# Patient Record
Sex: FEMALE | Race: WHITE | NOT HISPANIC OR LATINO | URBAN - METROPOLITAN AREA
[De-identification: names, ages, dates, MRNs, and addresses within clinical notes are randomized per-mention and may not be internally consistent; named-entity substitution may affect disease eponyms.]

---

## 2021-08-24 ENCOUNTER — OFFICE VISIT (OUTPATIENT)
Dept: FAMILY MEDICINE CLINIC | Facility: CLINIC | Age: 51
End: 2021-08-24

## 2021-08-24 VITALS
HEART RATE: 66 BPM | DIASTOLIC BLOOD PRESSURE: 86 MMHG | SYSTOLIC BLOOD PRESSURE: 150 MMHG | WEIGHT: 149.6 LBS | TEMPERATURE: 97 F | OXYGEN SATURATION: 98 % | HEIGHT: 63 IN | BODY MASS INDEX: 26.51 KG/M2

## 2021-08-24 DIAGNOSIS — G44.89 OTHER HEADACHE SYNDROME: ICD-10-CM

## 2021-08-24 DIAGNOSIS — Z78.0 MENOPAUSE: ICD-10-CM

## 2021-08-24 DIAGNOSIS — R60.9 EDEMA, UNSPECIFIED TYPE: ICD-10-CM

## 2021-08-24 DIAGNOSIS — N30.00 ACUTE CYSTITIS WITHOUT HEMATURIA: ICD-10-CM

## 2021-08-24 DIAGNOSIS — I10 ESSENTIAL HYPERTENSION: Primary | ICD-10-CM

## 2021-08-24 DIAGNOSIS — M79.2 NEURITIS: ICD-10-CM

## 2021-08-24 LAB
BILIRUB BLD-MCNC: NEGATIVE MG/DL
GLUCOSE UR STRIP-MCNC: NEGATIVE MG/DL
KETONES UR QL: NEGATIVE
LEUKOCYTE EST, POC: NEGATIVE
NITRITE UR-MCNC: NEGATIVE MG/ML
PH UR: 6 [PH] (ref 5–8)
PROT UR STRIP-MCNC: NEGATIVE MG/DL
RBC # UR STRIP: NEGATIVE /UL
SP GR UR: 1.01 (ref 1–1.03)
UROBILINOGEN UR QL: NORMAL

## 2021-08-24 PROCEDURE — 81003 URINALYSIS AUTO W/O SCOPE: CPT | Performed by: FAMILY MEDICINE

## 2021-08-24 PROCEDURE — 99204 OFFICE O/P NEW MOD 45 MIN: CPT | Performed by: FAMILY MEDICINE

## 2021-08-24 RX ORDER — ATENOLOL 25 MG/1
25 TABLET ORAL DAILY
Qty: 90 TABLET | Refills: 0 | Status: SHIPPED | OUTPATIENT
Start: 2021-08-24 | End: 2021-09-23

## 2021-08-24 RX ORDER — CALCIUM CARBONATE 260MG(650)
TABLET,CHEWABLE ORAL
COMMUNITY
End: 2022-07-15

## 2021-08-24 RX ORDER — ASCORBIC ACID 100 MG
TABLET,CHEWABLE ORAL
COMMUNITY
End: 2023-01-06

## 2021-08-24 RX ORDER — CETIRIZINE HYDROCHLORIDE 10 MG/1
10 TABLET ORAL DAILY
COMMUNITY
End: 2023-01-06

## 2021-08-24 RX ORDER — FLUOXETINE HYDROCHLORIDE 20 MG/1
20 CAPSULE ORAL DAILY
Qty: 90 CAPSULE | Refills: 0 | Status: SHIPPED | OUTPATIENT
Start: 2021-08-24 | End: 2021-09-30 | Stop reason: DRUGHIGH

## 2021-08-24 RX ORDER — HYDROCHLOROTHIAZIDE 12.5 MG/1
12.5 TABLET ORAL DAILY
Qty: 90 TABLET | Refills: 0 | Status: SHIPPED | OUTPATIENT
Start: 2021-08-24 | End: 2021-09-30

## 2021-08-24 NOTE — PROGRESS NOTES
"Chief Complaint  Establish Care, Hypertension, Menopause (Every month breast tenderness, hot flashes, pain in ovaries ), and Edema (Right Side of Foot, Legs)    Subjective          Tanya Brunner presents to [facility]  Here to establish.  Went to urgent with HA.  Blood pressure was very high.  Has had some swelling in her feet for the past month.  Tries to drink water.  Went to urgent care.  They put her back on her atenolol.  Has had HTN since her 20s.  Her previous MD had retired.  She has been having some increased headaches lately as well.  She denies any chest pain or shortness of breath.  She has been concerned about some hot flashes and other menopausal symptoms.  She had a hysterectomy subtotal several years ago.  When she was at the urgent care the other day she did have a UTI as well.      Objective   Vital Signs:   /86   Pulse 66   Temp 97 °F (36.1 °C)   Ht 160 cm (63\")   Wt 67.9 kg (149 lb 9.6 oz)   SpO2 98%   BMI 26.50 kg/m²     Physical Exam  Vitals and nursing note reviewed.   Constitutional:       General: She is not in acute distress.     Appearance: Normal appearance. She is well-developed.   HENT:      Head: Normocephalic and atraumatic.      Right Ear: Tympanic membrane, ear canal and external ear normal.      Left Ear: Tympanic membrane, ear canal and external ear normal.      Nose: Nose normal.      Mouth/Throat:      Mouth: Mucous membranes are moist.      Pharynx: Oropharynx is clear. No oropharyngeal exudate or posterior oropharyngeal erythema.   Eyes:      Conjunctiva/sclera: Conjunctivae normal.      Pupils: Pupils are equal, round, and reactive to light.   Neck:      Thyroid: No thyromegaly.   Cardiovascular:      Rate and Rhythm: Normal rate and regular rhythm.      Heart sounds: No murmur heard.     Pulmonary:      Effort: Pulmonary effort is normal.      Breath sounds: Normal breath sounds. No wheezing.   Abdominal:      General: Abdomen is " flat. Bowel sounds are normal. There is no distension.      Palpations: Abdomen is soft. There is no mass.      Tenderness: There is no abdominal tenderness.      Hernia: No hernia is present.   Musculoskeletal:         General: No swelling. Normal range of motion.      Cervical back: Normal range of motion and neck supple.      Right lower leg: Edema present.      Left lower leg: Edema present.      Comments: Edema bilateral lower extremities into the feet   Lymphadenopathy:      Cervical: No cervical adenopathy.   Skin:     General: Skin is warm and dry.      Capillary Refill: Capillary refill takes less than 2 seconds.      Findings: No rash.   Neurological:      General: No focal deficit present.      Mental Status: She is alert and oriented to person, place, and time.      Cranial Nerves: No cranial nerve deficit.   Psychiatric:         Mood and Affect: Mood normal.         Behavior: Behavior normal.        Result Review :                 Assessment and Plan    Diagnoses and all orders for this visit:    1. Essential hypertension (Primary)  -     CBC & Differential  -     Comprehensive Metabolic Panel  -     TSH  -     hydroCHLOROthiazide (HYDRODIURIL) 12.5 MG tablet; Take 1 tablet by mouth Daily.  Dispense: 90 tablet; Refill: 0  -     atenolol (TENORMIN) 25 MG tablet; Take 1 tablet by mouth Daily for 30 days.  Dispense: 90 tablet; Refill: 0    2. Edema, unspecified type  -     CBC & Differential  -     Comprehensive Metabolic Panel  -     TSH    3. Neuritis  -     CBC & Differential  -     Comprehensive Metabolic Panel  -     TSH  -     Vitamin B12  -     Vitamin B6    4. Menopause  -     Follicle Stimulating Hormone  -     FLUoxetine (PROzac) 20 MG capsule; Take 1 capsule by mouth Daily.  Dispense: 90 capsule; Refill: 0    5. Other headache syndrome  -     atenolol (TENORMIN) 25 MG tablet; Take 1 tablet by mouth Daily for 30 days.  Dispense: 90 tablet; Refill: 0    6. Acute cystitis without hematuria  -      Urine Culture - Urine, Urine, Clean Catch  -     POC Urinalysis Dipstick, Automated    Hypertension-not to goal, will add in hydrochlorothiazide and continue Tenormin  Edema-we will check labs to look for other etiology  Neuritis-we will check labs  Menopause-we will check an FSH and try fluoxetine  Follow-up acute cystitis-recheck urine is normal today    Follow Up   Return in about 6 weeks (around 10/5/2021).  Patient was given instructions and counseling regarding her condition or for health maintenance advice. Please see specific information pulled into the AVS if appropriate.

## 2021-08-26 LAB
BACTERIA UR CULT: NORMAL
BACTERIA UR CULT: NORMAL

## 2021-08-27 LAB
ALBUMIN SERPL-MCNC: 4.3 G/DL (ref 3.8–4.9)
ALBUMIN/GLOB SERPL: 1.5 {RATIO} (ref 1.2–2.2)
ALP SERPL-CCNC: 51 IU/L (ref 48–121)
ALT SERPL-CCNC: 26 IU/L (ref 0–32)
AST SERPL-CCNC: 31 IU/L (ref 0–40)
BASOPHILS # BLD AUTO: 0.1 X10E3/UL (ref 0–0.2)
BASOPHILS NFR BLD AUTO: 1 %
BILIRUB SERPL-MCNC: <0.2 MG/DL (ref 0–1.2)
BUN SERPL-MCNC: 6 MG/DL (ref 6–24)
BUN/CREAT SERPL: 9 (ref 9–23)
CALCIUM SERPL-MCNC: 9.1 MG/DL (ref 8.7–10.2)
CHLORIDE SERPL-SCNC: 102 MMOL/L (ref 96–106)
CO2 SERPL-SCNC: 21 MMOL/L (ref 20–29)
CREAT SERPL-MCNC: 0.64 MG/DL (ref 0.57–1)
EOSINOPHIL # BLD AUTO: 0.1 X10E3/UL (ref 0–0.4)
EOSINOPHIL NFR BLD AUTO: 1 %
ERYTHROCYTE [DISTWIDTH] IN BLOOD BY AUTOMATED COUNT: 12.2 % (ref 11.7–15.4)
FSH SERPL-ACNC: 7 MIU/ML
GLOBULIN SER CALC-MCNC: 2.8 G/DL (ref 1.5–4.5)
GLUCOSE SERPL-MCNC: 91 MG/DL (ref 65–99)
HCT VFR BLD AUTO: 40.3 % (ref 34–46.6)
HGB BLD-MCNC: 14.1 G/DL (ref 11.1–15.9)
IMM GRANULOCYTES # BLD AUTO: 0 X10E3/UL (ref 0–0.1)
IMM GRANULOCYTES NFR BLD AUTO: 0 %
LYMPHOCYTES # BLD AUTO: 3.2 X10E3/UL (ref 0.7–3.1)
LYMPHOCYTES NFR BLD AUTO: 40 %
MCH RBC QN AUTO: 36.1 PG (ref 26.6–33)
MCHC RBC AUTO-ENTMCNC: 35 G/DL (ref 31.5–35.7)
MCV RBC AUTO: 103 FL (ref 79–97)
MONOCYTES # BLD AUTO: 0.6 X10E3/UL (ref 0.1–0.9)
MONOCYTES NFR BLD AUTO: 8 %
NEUTROPHILS # BLD AUTO: 4 X10E3/UL (ref 1.4–7)
NEUTROPHILS NFR BLD AUTO: 50 %
PLATELET # BLD AUTO: 239 X10E3/UL (ref 150–450)
POTASSIUM SERPL-SCNC: 4.2 MMOL/L (ref 3.5–5.2)
PROT SERPL-MCNC: 7.1 G/DL (ref 6–8.5)
RBC # BLD AUTO: 3.91 X10E6/UL (ref 3.77–5.28)
SODIUM SERPL-SCNC: 137 MMOL/L (ref 134–144)
TSH SERPL DL<=0.005 MIU/L-ACNC: 2.64 UIU/ML (ref 0.45–4.5)
VIT B12 SERPL-MCNC: 1059 PG/ML (ref 232–1245)
VIT B6 SERPL-MCNC: 113.2 UG/L (ref 2–32.8)
WBC # BLD AUTO: 8 X10E3/UL (ref 3.4–10.8)

## 2021-08-31 LAB
FOLATE SERPL-MCNC: 14.3 NG/ML
WRITTEN AUTHORIZATION: NORMAL

## 2021-09-30 ENCOUNTER — OFFICE VISIT (OUTPATIENT)
Dept: FAMILY MEDICINE CLINIC | Facility: CLINIC | Age: 51
End: 2021-09-30

## 2021-09-30 ENCOUNTER — TELEPHONE (OUTPATIENT)
Dept: FAMILY MEDICINE CLINIC | Facility: CLINIC | Age: 51
End: 2021-09-30

## 2021-09-30 VITALS
OXYGEN SATURATION: 98 % | TEMPERATURE: 96 F | HEART RATE: 70 BPM | DIASTOLIC BLOOD PRESSURE: 80 MMHG | BODY MASS INDEX: 25.87 KG/M2 | SYSTOLIC BLOOD PRESSURE: 138 MMHG | HEIGHT: 63 IN | WEIGHT: 146 LBS

## 2021-09-30 DIAGNOSIS — D75.89 MACROCYTOSIS WITHOUT ANEMIA: ICD-10-CM

## 2021-09-30 DIAGNOSIS — I10 ESSENTIAL HYPERTENSION: Primary | ICD-10-CM

## 2021-09-30 DIAGNOSIS — Z20.822 CLOSE EXPOSURE TO COVID-19 VIRUS: ICD-10-CM

## 2021-09-30 DIAGNOSIS — N95.1 PERIMENOPAUSAL SYMPTOMS: ICD-10-CM

## 2021-09-30 PROCEDURE — 99214 OFFICE O/P EST MOD 30 MIN: CPT | Performed by: FAMILY MEDICINE

## 2021-09-30 RX ORDER — FLUOXETINE HYDROCHLORIDE 40 MG/1
40 CAPSULE ORAL DAILY
Qty: 90 CAPSULE | Refills: 1 | Status: SHIPPED | OUTPATIENT
Start: 2021-09-30 | End: 2022-01-04 | Stop reason: SINTOL

## 2021-09-30 NOTE — PROGRESS NOTES
"Chief Complaint  Hypertension    Subjective          Tanya Brunner presents to [facility]  Here for follow up blood pressure.   Has not been checking it at home.  Has not had chest pain or SOA.  Thinks she is having panic attacks at night.  Wakes up with heart pounding 3 times now.  Is under a lot of stress.  Fluoxetine has been helping some.  Denies any SI or HI.      Objective   Vital Signs:   /80   Pulse 70   Temp 96 °F (35.6 °C)   Ht 160 cm (63\")   Wt 66.2 kg (146 lb)   SpO2 98%   BMI 25.86 kg/m²     Physical Exam  Constitutional:       General: She is not in acute distress.     Appearance: Normal appearance. She is well-developed.   HENT:      Head: Normocephalic and atraumatic.      Right Ear: Tympanic membrane, ear canal and external ear normal.      Left Ear: Tympanic membrane, ear canal and external ear normal.      Mouth/Throat:      Mouth: Mucous membranes are moist.      Pharynx: Oropharynx is clear.   Eyes:      Conjunctiva/sclera: Conjunctivae normal.      Pupils: Pupils are equal, round, and reactive to light.   Neck:      Thyroid: No thyromegaly.   Cardiovascular:      Rate and Rhythm: Normal rate and regular rhythm.      Heart sounds: No murmur heard.     Pulmonary:      Effort: Pulmonary effort is normal.      Breath sounds: Normal breath sounds. No wheezing.   Musculoskeletal:         General: Normal range of motion.      Cervical back: Neck supple.   Lymphadenopathy:      Cervical: No cervical adenopathy.   Skin:     General: Skin is warm and dry.   Neurological:      Mental Status: She is alert and oriented to person, place, and time.   Psychiatric:         Mood and Affect: Mood normal.         Behavior: Behavior normal.        Result Review :            CBC & Differential (08/24/2021 14:53)  Comprehensive Metabolic Panel (08/24/2021 14:53)  TSH (08/24/2021 14:53)  Vitamin B12 (08/24/2021 14:53)  Vitamin B6 (08/24/2021 14:53)  Follicle Stimulating " Hormone (08/24/2021 14:53)  Folate (08/24/2021 14:53)       Assessment and Plan    Diagnoses and all orders for this visit:    1. Essential hypertension (Primary)  -     Basic Metabolic Panel  -     atenolol 25 MG tablet 25 mg, chlorthalidone 25 MG tablet 12.5 mg per tablet; Take 0.5 tablets by mouth Daily.  Dispense: 45 tablet; Refill: 1    2. Macrocytosis without anemia  -     CBC & Differential    3. Close exposure to COVID-19 virus  -     SARS-CoV-2 Antibodies (Roche)    4. Perimenopausal symptoms  -     FLUoxetine (PROzac) 40 MG capsule; Take 1 capsule by mouth Daily.  Dispense: 90 capsule; Refill: 1        Follow Up   Return in about 3 months (around 12/30/2021) for Recheck HTN.  Patient was given instructions and counseling regarding her condition or for health maintenance advice. Please see specific information pulled into the AVS if appropriate.

## 2021-10-01 LAB
BASOPHILS # BLD AUTO: 0.03 10*3/MM3 (ref 0–0.2)
BASOPHILS NFR BLD AUTO: 0.4 % (ref 0–1.5)
BUN SERPL-MCNC: 9 MG/DL (ref 6–20)
BUN/CREAT SERPL: 13.8 (ref 7–25)
CALCIUM SERPL-MCNC: 9.2 MG/DL (ref 8.6–10.5)
CHLORIDE SERPL-SCNC: 101 MMOL/L (ref 98–107)
CO2 SERPL-SCNC: 25.6 MMOL/L (ref 22–29)
CREAT SERPL-MCNC: 0.65 MG/DL (ref 0.57–1)
EOSINOPHIL # BLD AUTO: 0.04 10*3/MM3 (ref 0–0.4)
EOSINOPHIL NFR BLD AUTO: 0.5 % (ref 0.3–6.2)
ERYTHROCYTE [DISTWIDTH] IN BLOOD BY AUTOMATED COUNT: 12.3 % (ref 12.3–15.4)
GLUCOSE SERPL-MCNC: 76 MG/DL (ref 65–99)
HCT VFR BLD AUTO: 36.8 % (ref 34–46.6)
HGB BLD-MCNC: 13.1 G/DL (ref 12–15.9)
IMM GRANULOCYTES # BLD AUTO: 0.02 10*3/MM3 (ref 0–0.05)
IMM GRANULOCYTES NFR BLD AUTO: 0.3 % (ref 0–0.5)
LYMPHOCYTES # BLD AUTO: 2.56 10*3/MM3 (ref 0.7–3.1)
LYMPHOCYTES NFR BLD AUTO: 32.4 % (ref 19.6–45.3)
MCH RBC QN AUTO: 36.2 PG (ref 26.6–33)
MCHC RBC AUTO-ENTMCNC: 35.6 G/DL (ref 31.5–35.7)
MCV RBC AUTO: 101.7 FL (ref 79–97)
MONOCYTES # BLD AUTO: 0.65 10*3/MM3 (ref 0.1–0.9)
MONOCYTES NFR BLD AUTO: 8.2 % (ref 5–12)
NEUTROPHILS # BLD AUTO: 4.6 10*3/MM3 (ref 1.7–7)
NEUTROPHILS NFR BLD AUTO: 58.2 % (ref 42.7–76)
NRBC BLD AUTO-RTO: 0 /100 WBC (ref 0–0.2)
PLATELET # BLD AUTO: 244 10*3/MM3 (ref 140–450)
POTASSIUM SERPL-SCNC: 4 MMOL/L (ref 3.5–5.2)
RBC # BLD AUTO: 3.62 10*6/MM3 (ref 3.77–5.28)
SARS-COV-2 AB SERPL QL IA: NEGATIVE
SODIUM SERPL-SCNC: 140 MMOL/L (ref 136–145)
WBC # BLD AUTO: 7.9 10*3/MM3 (ref 3.4–10.8)

## 2022-01-03 RX ORDER — ATENOLOL AND CHLORTHALIDONE TABLET 50; 25 MG/1; MG/1
TABLET ORAL
Qty: 45 TABLET | Refills: 0 | Status: SHIPPED | OUTPATIENT
Start: 2022-01-03 | End: 2022-01-04 | Stop reason: SDUPTHER

## 2022-01-04 ENCOUNTER — OFFICE VISIT (OUTPATIENT)
Dept: FAMILY MEDICINE CLINIC | Facility: CLINIC | Age: 52
End: 2022-01-04

## 2022-01-04 VITALS
OXYGEN SATURATION: 100 % | BODY MASS INDEX: 26.75 KG/M2 | DIASTOLIC BLOOD PRESSURE: 64 MMHG | TEMPERATURE: 97.7 F | WEIGHT: 151 LBS | SYSTOLIC BLOOD PRESSURE: 128 MMHG | HEART RATE: 68 BPM | HEIGHT: 63 IN

## 2022-01-04 DIAGNOSIS — Z12.31 ENCOUNTER FOR SCREENING MAMMOGRAM FOR MALIGNANT NEOPLASM OF BREAST: ICD-10-CM

## 2022-01-04 DIAGNOSIS — I10 ESSENTIAL HYPERTENSION: Primary | ICD-10-CM

## 2022-01-04 PROCEDURE — 99213 OFFICE O/P EST LOW 20 MIN: CPT | Performed by: FAMILY MEDICINE

## 2022-01-04 RX ORDER — ATENOLOL AND CHLORTHALIDONE TABLET 50; 25 MG/1; MG/1
0.5 TABLET ORAL DAILY
Qty: 45 TABLET | Refills: 3 | Status: SHIPPED | OUTPATIENT
Start: 2022-01-04 | End: 2023-01-06 | Stop reason: SDUPTHER

## 2022-01-04 NOTE — PROGRESS NOTES
"Chief Complaint  Med Refill and Hypertension    Subjective          Tanya Brunner presents to [facility]  Patient presents for blood pressure follow-up.  She is doing very well with her current medication.  She could not take the fluoxetine for her hot flashes because it made her feel off.  She is not having any headaches anymore.  She denies any chest pain or shortness of breath.      Objective   Vital Signs:   /64   Pulse 68   Temp 97.7 °F (36.5 °C)   Ht 160 cm (63\")   Wt 68.5 kg (151 lb)   SpO2 100%   BMI 26.75 kg/m²     Physical Exam  Constitutional:       General: She is not in acute distress.     Appearance: Normal appearance. She is well-developed.   HENT:      Head: Normocephalic and atraumatic.      Right Ear: External ear normal.      Left Ear: External ear normal.   Eyes:      Conjunctiva/sclera: Conjunctivae normal.      Pupils: Pupils are equal, round, and reactive to light.   Neck:      Thyroid: No thyromegaly.   Cardiovascular:      Rate and Rhythm: Normal rate and regular rhythm.      Heart sounds: No murmur heard.      Pulmonary:      Effort: Pulmonary effort is normal.      Breath sounds: Normal breath sounds. No wheezing.   Musculoskeletal:         General: Normal range of motion.      Cervical back: Neck supple.   Lymphadenopathy:      Cervical: No cervical adenopathy.   Skin:     General: Skin is warm and dry.   Neurological:      Mental Status: She is alert and oriented to person, place, and time.   Psychiatric:         Mood and Affect: Mood normal.         Behavior: Behavior normal.        Result Review :                 Assessment and Plan    Diagnoses and all orders for this visit:    1. Essential hypertension (Primary)  -     atenolol-chlorthalidone (TENORETIC) 50-25 MG per tablet; Take 0.5 tablets by mouth Daily.  Dispense: 45 tablet; Refill: 3  -     Basic Metabolic Panel    2. Encounter for screening mammogram for malignant neoplasm of breast  - "     Mammo Screening Digital Tomosynthesis Bilateral With CAD; Future    Hypertension-controlled, continue current medication and check a BMP today  Follow-up for physical    Follow Up   Return in about 6 months (around 7/4/2022) for Annual physical.  Patient was given instructions and counseling regarding her condition or for health maintenance advice. Please see specific information pulled into the AVS if appropriate.

## 2022-01-05 LAB
BUN SERPL-MCNC: 9 MG/DL (ref 6–24)
BUN/CREAT SERPL: 13 (ref 9–23)
CALCIUM SERPL-MCNC: 9.4 MG/DL (ref 8.7–10.2)
CHLORIDE SERPL-SCNC: 100 MMOL/L (ref 96–106)
CO2 SERPL-SCNC: 25 MMOL/L (ref 20–29)
CREAT SERPL-MCNC: 0.67 MG/DL (ref 0.57–1)
GLUCOSE SERPL-MCNC: 99 MG/DL (ref 65–99)
POTASSIUM SERPL-SCNC: 3.6 MMOL/L (ref 3.5–5.2)
SODIUM SERPL-SCNC: 142 MMOL/L (ref 134–144)

## 2022-02-04 DIAGNOSIS — Z12.31 ENCOUNTER FOR SCREENING MAMMOGRAM FOR MALIGNANT NEOPLASM OF BREAST: ICD-10-CM

## 2022-07-05 ENCOUNTER — TELEPHONE (OUTPATIENT)
Dept: FAMILY MEDICINE CLINIC | Facility: CLINIC | Age: 52
End: 2022-07-05

## 2022-07-05 NOTE — TELEPHONE ENCOUNTER
Caller: Brunner, Tanya    Relationship: Self    Best call back number:[phone]    What is the best time to reach you: THIS MORNING- SHOULD SHE KEEP HER APPOINTMENT WITH SYMPTOMS/   7/6/22?    Who are you requesting to speak with (clinical staff, provider,  specific staff member): CLINICAL      What was the call regarding: PATIENT HAS AN APPOINTMENT TOMORROW.  SHE IS EXPERIENCING SINUS AND ALLERGY ISSUES, COUGHING, CONGESTION AND HAS TESTED ON 7/3/22 FOR COVID WHICH WAS NEGATIVE.  PATIENT HAS SWOLLEN GLANDS AND CONTINUED COUGHING NOW FOR 3 WEEKS.    Do you require a callback: YES

## 2022-07-06 ENCOUNTER — OFFICE VISIT (OUTPATIENT)
Dept: FAMILY MEDICINE CLINIC | Facility: CLINIC | Age: 52
End: 2022-07-06

## 2022-07-06 VITALS
HEART RATE: 58 BPM | HEIGHT: 63 IN | TEMPERATURE: 97.3 F | WEIGHT: 151.2 LBS | DIASTOLIC BLOOD PRESSURE: 66 MMHG | BODY MASS INDEX: 26.79 KG/M2 | SYSTOLIC BLOOD PRESSURE: 108 MMHG | OXYGEN SATURATION: 97 %

## 2022-07-06 DIAGNOSIS — Z11.59 NEED FOR HEPATITIS C SCREENING TEST: ICD-10-CM

## 2022-07-06 DIAGNOSIS — N95.1 PERIMENOPAUSAL SYMPTOMS: ICD-10-CM

## 2022-07-06 DIAGNOSIS — J30.1 SEASONAL ALLERGIC RHINITIS DUE TO POLLEN: ICD-10-CM

## 2022-07-06 DIAGNOSIS — Z00.00 ROUTINE HEALTH MAINTENANCE: Primary | ICD-10-CM

## 2022-07-06 DIAGNOSIS — R05.9 COUGH: ICD-10-CM

## 2022-07-06 DIAGNOSIS — M79.671 PAIN OF RIGHT HEEL: ICD-10-CM

## 2022-07-06 DIAGNOSIS — I10 ESSENTIAL HYPERTENSION: ICD-10-CM

## 2022-07-06 LAB
EXPIRATION DATE: NORMAL
INTERNAL CONTROL: NORMAL
Lab: NORMAL
SARS-COV-2 AG UPPER RESP QL IA.RAPID: NOT DETECTED

## 2022-07-06 PROCEDURE — 99213 OFFICE O/P EST LOW 20 MIN: CPT | Performed by: FAMILY MEDICINE

## 2022-07-06 PROCEDURE — 87426 SARSCOV CORONAVIRUS AG IA: CPT | Performed by: FAMILY MEDICINE

## 2022-07-06 PROCEDURE — 99396 PREV VISIT EST AGE 40-64: CPT | Performed by: FAMILY MEDICINE

## 2022-07-06 RX ORDER — ALBUTEROL SULFATE 90 UG/1
2 AEROSOL, METERED RESPIRATORY (INHALATION) EVERY 4 HOURS PRN
Qty: 18 G | Refills: 0 | Status: SHIPPED | OUTPATIENT
Start: 2022-07-06 | End: 2022-07-15 | Stop reason: SDUPTHER

## 2022-07-06 RX ORDER — OMEGA-3S/DHA/EPA/FISH OIL/D3 300MG-1000
400 CAPSULE ORAL DAILY
COMMUNITY

## 2022-07-06 NOTE — PROGRESS NOTES
Subjective   Tanya Brunner is a 51 y.o. female who presents for annual female wellness exam.  Chief Complaint   Patient presents with   • Annual Exam   • Hypertension   • Cough     For 3 weeks sinuses      Has been coughing for 3 weeks.  Does have grass allergies.  Worse phelgm over the past 3-4 days with post nasal drainage.  Bad at night.  Ear and head congestion.  No ST.  No fever.  Has not been taking her Flonase.  Has not been taking any antihistamine.  Has been having pain in her right heel for few weeks.  No injuries.  No swelling.    Menstrual History: s/p hyst, still has ovaries  Pregnancy History: , s/p   Sexual History: some, with spouse   Contraception: na  Hormone Replacement Therapy: na  Diet: standard  Exercise: yes, pool, yardwork  Up to date with eye and dental.    Mammogram: 2022  Pap Smear: na  Bone Density: na  Colon Cancer Screenin/3/2018 diverticula    Immunization History   Administered Date(s) Administered   • Fluzone Split Quad (Multi-dose) 2018       The following portions of the patient's history were reviewed and updated as appropriate: allergies, current medications, past family history, past medical history, past social history, past surgical history and problem list.    Past Medical History:   Diagnosis Date   • Hypertension        Past Surgical History:   Procedure Laterality Date   • INGUINAL HERNIA REPAIR Left    • LAPAROSCOPIC VAGINAL HYSTERECTOMY     • OVARIAN CYST REMOVAL Left        History reviewed. No pertinent family history.    Social History     Socioeconomic History   • Marital status:    Tobacco Use   • Smoking status: Never Smoker   • Smokeless tobacco: Never Used   Vaping Use   • Vaping Use: Never used   Substance and Sexual Activity   • Alcohol use: Never   • Drug use: Never   • Sexual activity: Defer         Current Outpatient Medications:   •  atenolol-chlorthalidone (TENORETIC) 50-25 MG per tablet, Take 0.5 tablets by mouth Daily.,  "Disp: 45 tablet, Rfl: 3  •  cholecalciferol (VITAMIN D3) 10 MCG (400 UNIT) tablet, Take 400 Units by mouth Daily., Disp: , Rfl:   •  Ginger, Zingiber officinalis, (GINGER ROOT PO), Take  by mouth., Disp: , Rfl:   •  albuterol sulfate  (90 Base) MCG/ACT inhaler, Inhale 2 puffs Every 4 (Four) Hours As Needed for Wheezing or Shortness of Air (cough)., Disp: 18 g, Rfl: 0  •  Ascorbic Acid (Vitamin C) 100 MG chewable tablet, Chew., Disp: , Rfl:   •  cetirizine (zyrTEC) 10 MG tablet, Take 10 mg by mouth Daily., Disp: , Rfl:   •  fluticasone (FLONASE) 50 MCG/ACT nasal spray, 2 sprays into the nostril(s) as directed by provider Daily for 7 days., Disp: 11.1 mL, Rfl: 0  •  Niacin (VITAMIN B-3 PO), Take  by mouth., Disp: , Rfl:   •  Pyridoxine HCl (B-6 PO), Take  by mouth., Disp: , Rfl:   •  Zinc 10 MG lozenge, Dissolve  in the mouth., Disp: , Rfl:     Review of Systems    Objective   Vitals:    07/06/22 0917   BP: 108/66   Pulse: 58   Temp: 97.3 °F (36.3 °C)   SpO2: 97%   Weight: 68.6 kg (151 lb 3.2 oz)   Height: 160 cm (63\")     Body mass index is 26.78 kg/m².  Physical Exam  Vitals and nursing note reviewed.   Constitutional:       General: She is not in acute distress.     Appearance: Normal appearance. She is well-developed.   HENT:      Head: Normocephalic and atraumatic.      Right Ear: Tympanic membrane, ear canal and external ear normal.      Left Ear: Tympanic membrane, ear canal and external ear normal.      Nose: Nose normal.      Mouth/Throat:      Mouth: Mucous membranes are moist.      Pharynx: Oropharynx is clear. No oropharyngeal exudate or posterior oropharyngeal erythema.   Eyes:      Conjunctiva/sclera: Conjunctivae normal.      Pupils: Pupils are equal, round, and reactive to light.   Neck:      Thyroid: No thyromegaly.   Cardiovascular:      Rate and Rhythm: Normal rate and regular rhythm.      Heart sounds: No murmur heard.  Pulmonary:      Effort: Pulmonary effort is normal.      Breath sounds: " Normal breath sounds. No wheezing.      Comments: Patient coughed frequently during exam  Abdominal:      General: Abdomen is flat. Bowel sounds are normal. There is no distension.      Palpations: Abdomen is soft. There is no mass.      Tenderness: There is no abdominal tenderness.      Hernia: No hernia is present.   Musculoskeletal:         General: No swelling. Normal range of motion.      Cervical back: Normal range of motion and neck supple.      Right lower leg: No edema.      Left lower leg: No edema.      Comments: Unable to reproduce tenderness in right heel and ankle.   Lymphadenopathy:      Cervical: No cervical adenopathy.   Skin:     General: Skin is warm and dry.      Capillary Refill: Capillary refill takes less than 2 seconds.      Findings: No rash.   Neurological:      General: No focal deficit present.      Mental Status: She is alert and oriented to person, place, and time.      Cranial Nerves: No cranial nerve deficit.   Psychiatric:         Mood and Affect: Mood normal.         Behavior: Behavior normal.           Assessment & Plan   Diagnoses and all orders for this visit:    1. Routine health maintenance (Primary)    2. Essential hypertension  -     Lipid Panel  -     CBC & Differential  -     Comprehensive Metabolic Panel  -     TSH    3. Need for hepatitis C screening test  -     Hepatitis C Antibody    4. Perimenopausal symptoms  -     Follicle Stimulating Hormone    5. Seasonal allergic rhinitis due to pollen    6. Cough  -     POCT SARS-CoV-2 Antigen POLA  -     albuterol sulfate  (90 Base) MCG/ACT inhaler; Inhale 2 puffs Every 4 (Four) Hours As Needed for Wheezing or Shortness of Air (cough).  Dispense: 18 g; Refill: 0    7. Pain of right heel    Hypertension-controlled, monitoring labs today  Menopausal symptoms-we will check an FSH  Allergies-get back on an antihistamine  Cough-we will check a SARS COVID rapid test and do a trial of albuterol for the cough  Pain in heel-do  trial of topicals versus ice and stretches, let me know when ready to see a specialist, etiology Achilles tendinitis versus plantar fasciitis           Discussed the importance of maintaining a healthy weight and getting regular exercise.  Educated patient on the benefits of healthy diet.  Advise follow-up annually for wellness exams.    Patient Instructions   Try the albuterol for cough.  Get on zyrtec for allergies.    Let me know if heel gets worse and you are ready to see specialist.

## 2022-07-06 NOTE — PATIENT INSTRUCTIONS
Try the albuterol for cough.  Get on zyrtec for allergies.    Let me know if heel gets worse and you are ready to see specialist.

## 2022-07-07 LAB
ALBUMIN SERPL-MCNC: 4.3 G/DL (ref 3.8–4.9)
ALBUMIN/GLOB SERPL: 1.5 {RATIO} (ref 1.2–2.2)
ALP SERPL-CCNC: 54 IU/L (ref 44–121)
ALT SERPL-CCNC: 52 IU/L (ref 0–32)
AST SERPL-CCNC: 34 IU/L (ref 0–40)
BASOPHILS # BLD AUTO: 0 X10E3/UL (ref 0–0.2)
BASOPHILS NFR BLD AUTO: 0 %
BILIRUB SERPL-MCNC: 0.4 MG/DL (ref 0–1.2)
BUN SERPL-MCNC: 10 MG/DL (ref 6–24)
BUN/CREAT SERPL: 15 (ref 9–23)
CALCIUM SERPL-MCNC: 9.4 MG/DL (ref 8.7–10.2)
CHLORIDE SERPL-SCNC: 99 MMOL/L (ref 96–106)
CHOLEST SERPL-MCNC: 199 MG/DL (ref 100–199)
CO2 SERPL-SCNC: 26 MMOL/L (ref 20–29)
CREAT SERPL-MCNC: 0.66 MG/DL (ref 0.57–1)
EGFRCR SERPLBLD CKD-EPI 2021: 105 ML/MIN/1.73
EOSINOPHIL # BLD AUTO: 0.1 X10E3/UL (ref 0–0.4)
EOSINOPHIL NFR BLD AUTO: 1 %
ERYTHROCYTE [DISTWIDTH] IN BLOOD BY AUTOMATED COUNT: 12.3 % (ref 11.7–15.4)
FSH SERPL-ACNC: 5.2 MIU/ML
GLOBULIN SER CALC-MCNC: 2.8 G/DL (ref 1.5–4.5)
GLUCOSE SERPL-MCNC: 80 MG/DL (ref 65–99)
HCT VFR BLD AUTO: 40 % (ref 34–46.6)
HCV AB S/CO SERPL IA: <0.1 S/CO RATIO (ref 0–0.9)
HDLC SERPL-MCNC: 44 MG/DL
HGB BLD-MCNC: 14.1 G/DL (ref 11.1–15.9)
IMM GRANULOCYTES # BLD AUTO: 0 X10E3/UL (ref 0–0.1)
IMM GRANULOCYTES NFR BLD AUTO: 0 %
LDLC SERPL CALC-MCNC: 126 MG/DL (ref 0–99)
LYMPHOCYTES # BLD AUTO: 3.1 X10E3/UL (ref 0.7–3.1)
LYMPHOCYTES NFR BLD AUTO: 32 %
MCH RBC QN AUTO: 36.1 PG (ref 26.6–33)
MCHC RBC AUTO-ENTMCNC: 35.3 G/DL (ref 31.5–35.7)
MCV RBC AUTO: 102 FL (ref 79–97)
MONOCYTES # BLD AUTO: 0.8 X10E3/UL (ref 0.1–0.9)
MONOCYTES NFR BLD AUTO: 8 %
NEUTROPHILS # BLD AUTO: 5.8 X10E3/UL (ref 1.4–7)
NEUTROPHILS NFR BLD AUTO: 59 %
PLATELET # BLD AUTO: 291 X10E3/UL (ref 150–450)
POTASSIUM SERPL-SCNC: 4.1 MMOL/L (ref 3.5–5.2)
PROT SERPL-MCNC: 7.1 G/DL (ref 6–8.5)
RBC # BLD AUTO: 3.91 X10E6/UL (ref 3.77–5.28)
SODIUM SERPL-SCNC: 139 MMOL/L (ref 134–144)
TRIGL SERPL-MCNC: 161 MG/DL (ref 0–149)
TSH SERPL DL<=0.005 MIU/L-ACNC: 1.67 UIU/ML (ref 0.45–4.5)
VLDLC SERPL CALC-MCNC: 29 MG/DL (ref 5–40)
WBC # BLD AUTO: 9.8 X10E3/UL (ref 3.4–10.8)

## 2022-07-15 ENCOUNTER — OFFICE VISIT (OUTPATIENT)
Dept: FAMILY MEDICINE CLINIC | Facility: CLINIC | Age: 52
End: 2022-07-15

## 2022-07-15 ENCOUNTER — TELEPHONE (OUTPATIENT)
Dept: FAMILY MEDICINE CLINIC | Facility: CLINIC | Age: 52
End: 2022-07-15

## 2022-07-15 VITALS
TEMPERATURE: 97.1 F | OXYGEN SATURATION: 99 % | HEIGHT: 63 IN | WEIGHT: 150.6 LBS | HEART RATE: 64 BPM | DIASTOLIC BLOOD PRESSURE: 70 MMHG | BODY MASS INDEX: 26.68 KG/M2 | SYSTOLIC BLOOD PRESSURE: 100 MMHG

## 2022-07-15 DIAGNOSIS — J30.1 SEASONAL ALLERGIC RHINITIS DUE TO POLLEN: ICD-10-CM

## 2022-07-15 DIAGNOSIS — R05.9 COUGH: Primary | ICD-10-CM

## 2022-07-15 LAB
EXPIRATION DATE: NORMAL
FLUAV AG UPPER RESP QL IA.RAPID: NOT DETECTED
FLUBV AG UPPER RESP QL IA.RAPID: NOT DETECTED
INTERNAL CONTROL: NORMAL
Lab: NORMAL
SARS-COV-2 AG UPPER RESP QL IA.RAPID: NOT DETECTED

## 2022-07-15 PROCEDURE — 99214 OFFICE O/P EST MOD 30 MIN: CPT | Performed by: FAMILY MEDICINE

## 2022-07-15 PROCEDURE — 87428 SARSCOV & INF VIR A&B AG IA: CPT | Performed by: FAMILY MEDICINE

## 2022-07-15 RX ORDER — MONTELUKAST SODIUM 10 MG/1
10 TABLET ORAL NIGHTLY
Qty: 30 TABLET | Refills: 0 | Status: SHIPPED | OUTPATIENT
Start: 2022-07-15 | End: 2022-08-12

## 2022-07-15 RX ORDER — ALBUTEROL SULFATE 90 UG/1
2 AEROSOL, METERED RESPIRATORY (INHALATION) EVERY 4 HOURS PRN
Qty: 18 G | Refills: 0 | Status: SHIPPED | OUTPATIENT
Start: 2022-07-15 | End: 2023-01-06

## 2022-07-15 NOTE — PROGRESS NOTES
"Chief Complaint  Cough and Nasal Congestion (3 weeks going on 4th)    Subjective        Tanya Brunner presents to [facility]  History of Present Illness    The patient reports that she has had symptoms for about 2.5 weeks. She states that she was sick before then with allergies. She notes that she saw Dr. Diamond on 07/06/2022.  She reports that she was given albuterol inhaler. She states that she did a COVID test, which was negative. She notes that the inhaler has helped, but she still has a cough. She states that it wont go away. She reports that she has never smoked, and does not have a history of asthma. She states she has been taking Zyrtec, but that it has not helped. She notes that she tried Delsym cough medication, and that she was taking it more often than it said to because it was it helping with her cough. She denies any swelling in her legs prior to her symptoms.  Patient denies any chest pain or shortness of breath.  Otherwise the patient states that she is feeling fine.  She denies fatigue or fevers.    Objective   Vital Signs:  /70   Pulse 64   Temp 97.1 °F (36.2 °C)   Ht 160 cm (63\")   Wt 68.3 kg (150 lb 9.6 oz)   SpO2 99%   BMI 26.68 kg/m²   Estimated body mass index is 26.68 kg/m² as calculated from the following:    Height as of this encounter: 160 cm (63\").    Weight as of this encounter: 68.3 kg (150 lb 9.6 oz).          Physical Exam  Vitals and nursing note reviewed.   Constitutional:       Appearance: She is well-developed.   HENT:      Head: Normocephalic and atraumatic.      Right Ear: External ear normal.      Left Ear: External ear normal.      Nose: Nose normal.   Eyes:      General: No scleral icterus.     Conjunctiva/sclera: Conjunctivae normal.   Cardiovascular:      Rate and Rhythm: Normal rate and regular rhythm.      Heart sounds: Normal heart sounds.   Pulmonary:      Effort: Pulmonary effort is normal.      Breath sounds: Normal breath " sounds.   Musculoskeletal:      Cervical back: Normal range of motion and neck supple.   Lymphadenopathy:      Cervical: No cervical adenopathy.   Skin:     General: Skin is warm and dry.      Findings: No rash.   Neurological:      Mental Status: She is alert and oriented to person, place, and time.   Psychiatric:         Mood and Affect: Mood normal.         Behavior: Behavior normal.         Thought Content: Thought content normal.         Judgment: Judgment normal.        Result Review :  The following data was reviewed by: Nkechi Luis DO on 07/15/2022:  Common labs    Common Labsle 9/30/21 9/30/21 1/4/22 7/6/22 7/6/22 7/6/22    1026 1026  1021 1021 1021   Glucose 76  99   80   BUN 9  9   10   Creatinine 0.65  0.67   0.66   eGFR Non  Am 96  101      eGFR  Am 116  117      Sodium 140  142   139   Potassium 4.0  3.6   4.1   Chloride 101  100   99   Calcium 9.2  9.4   9.4   Total Protein      7.1   Albumin      4.3   Total Bilirubin      0.4   Alkaline Phosphatase      54   AST (SGOT)      34   ALT (SGPT)      52 (A)   WBC  7.90   9.8    Hemoglobin  13.1   14.1    Hematocrit  36.8   40.0    Platelets  244   291    Total Cholesterol    199     Triglycerides    161 (A)     HDL Cholesterol    44     LDL Cholesterol     126 (A)     (A) Abnormal value       Comments are available for some flowsheets but are not being displayed.               Covid Tests    Common Labsle 9/30/21   SARS-COV-2 ANTIBODIES Negative      Comments are available for some flowsheets but are not being displayed.               Data reviewed: See below     Office Visit with Kehrer, Meredith Lea, MD (07/06/2022)       Assessment and Plan   Diagnoses and all orders for this visit:    1. Cough (Primary)  -     POCT SARS-CoV-2 Antigen POLA + Flu  -     montelukast (Singulair) 10 MG tablet; Take 1 tablet by mouth Every Night.  Dispense: 30 tablet; Refill: 0  -     albuterol sulfate  (90 Base) MCG/ACT inhaler; Inhale 2 puffs  Every 4 (Four) Hours As Needed for Wheezing or Shortness of Air (cough).  Dispense: 18 g; Refill: 0    2. Seasonal allergic rhinitis due to pollen  -     montelukast (Singulair) 10 MG tablet; Take 1 tablet by mouth Every Night.  Dispense: 30 tablet; Refill: 0    1. Reactive airway  -Influenza A, influenza B, and COVID rapid tests are all negative in the office today.  -She will continue to use her albuterol, and I will send in a refill.  -I encouraged her to drink a lot of water.  -I am going to send in montelukast 10 mg nightly.  The patient should take that consistently until her symptoms improve..  -She can continue to take Zyrtec up to twice daily.  -She may talk to Dr. Diamond about going to an allergist in the future.           Follow Up   No follow-ups on file.  Patient was given instructions and counseling regarding her condition or for health maintenance advice. Please see specific information pulled into the AVS if appropriate.     Transcribed from ambient dictation for Nkechi Luis DO by Gracia Velasco.  07/15/22   13:09 EDT    Patient verbalized consent to the visit recording.

## 2022-07-15 NOTE — TELEPHONE ENCOUNTER
Caller: Brunner, Tanya    Relationship: Self    Best call back number: [phone]     Requested Prescriptions:    COUGH / CONGESTION MEDICATION    Pharmacy where request should be sent:    [facility]/pharmacy #[id number] - [place], [place] - [facility] - [phone]  - [phone]   [phone]    Additional details provided by patient: PATIENT IS CALLING TO STATE SHE STILL HAS A REALLY BAD COUGH.  SHE STATES THAT SHE IS MUCH WORSE AT NIGHT.  SHE STATES HER HEAD HURTS SOMETIMES AS WELL, WITH DRAINAGE.  SHE STATES SHE HAS BEEN TAKING DELSYM AND COUGH DROPS. PATIENT IS REQUESTING SOMETHING TO HELP  CLEAR THE COUGH AND HELP HER TO REST AT NIGHT.   SHE STATES SHE DOES NOT KNOW IF SHE NEEDS AN ANTIBIOTIC OR A COUGH MEDICATION.    Does the patient have less than a 3 day supply:  [x] Yes  [] No    Ruth Baker Rep   07/15/22 09:12 EDT     PLEASE ADVISE.

## 2022-07-18 ENCOUNTER — TELEPHONE (OUTPATIENT)
Dept: FAMILY MEDICINE CLINIC | Facility: CLINIC | Age: 52
End: 2022-07-18

## 2022-07-18 DIAGNOSIS — R05.9 COUGH: Primary | ICD-10-CM

## 2022-07-18 NOTE — TELEPHONE ENCOUNTER
I saw her a couple weeks ago for cough.  Has she not been trying the albuterol since Xopenex is preferred?  Please clarify if she still wants to try an inhaler.

## 2022-07-19 RX ORDER — LEVALBUTEROL TARTRATE 45 UG/1
1-2 AEROSOL, METERED ORAL EVERY 4 HOURS PRN
Qty: 15 G | Refills: 0 | Status: SHIPPED | OUTPATIENT
Start: 2022-07-19 | End: 2023-01-06

## 2022-07-19 NOTE — TELEPHONE ENCOUNTER
Spoke with pt she is still coughing pretty bad, especially at night   she would like to try the xopenex, can you send in a new prescription ?

## 2022-08-12 DIAGNOSIS — R05.9 COUGH: ICD-10-CM

## 2022-08-12 DIAGNOSIS — J30.1 SEASONAL ALLERGIC RHINITIS DUE TO POLLEN: ICD-10-CM

## 2022-08-12 RX ORDER — MONTELUKAST SODIUM 10 MG/1
TABLET ORAL
Qty: 30 TABLET | Refills: 0 | Status: SHIPPED | OUTPATIENT
Start: 2022-08-12 | End: 2022-09-12

## 2022-09-11 DIAGNOSIS — R05.9 COUGH: ICD-10-CM

## 2022-09-11 DIAGNOSIS — J30.1 SEASONAL ALLERGIC RHINITIS DUE TO POLLEN: ICD-10-CM

## 2022-09-12 RX ORDER — MONTELUKAST SODIUM 10 MG/1
TABLET ORAL
Qty: 90 TABLET | Refills: 3 | Status: SHIPPED | OUTPATIENT
Start: 2022-09-12

## 2023-01-06 ENCOUNTER — OFFICE VISIT (OUTPATIENT)
Dept: FAMILY MEDICINE CLINIC | Facility: CLINIC | Age: 53
End: 2023-01-06
Payer: COMMERCIAL

## 2023-01-06 VITALS
OXYGEN SATURATION: 98 % | WEIGHT: 153.8 LBS | TEMPERATURE: 97.3 F | SYSTOLIC BLOOD PRESSURE: 122 MMHG | HEART RATE: 74 BPM | DIASTOLIC BLOOD PRESSURE: 76 MMHG | HEIGHT: 63 IN | BODY MASS INDEX: 27.25 KG/M2

## 2023-01-06 DIAGNOSIS — K64.4 EXTERNAL HEMORRHOIDS: ICD-10-CM

## 2023-01-06 DIAGNOSIS — M79.2 NEURITIS: ICD-10-CM

## 2023-01-06 DIAGNOSIS — M25.50 ARTHRALGIA, UNSPECIFIED JOINT: ICD-10-CM

## 2023-01-06 DIAGNOSIS — I10 ESSENTIAL HYPERTENSION: Primary | ICD-10-CM

## 2023-01-06 PROCEDURE — 99214 OFFICE O/P EST MOD 30 MIN: CPT | Performed by: FAMILY MEDICINE

## 2023-01-06 RX ORDER — ATENOLOL AND CHLORTHALIDONE TABLET 50; 25 MG/1; MG/1
0.5 TABLET ORAL DAILY
Qty: 45 TABLET | Refills: 3 | Status: SHIPPED | OUTPATIENT
Start: 2023-01-06

## 2023-01-06 NOTE — PROGRESS NOTES
Chief Complaint  Med Refill, Hypertension, Hemorrhoids, and perimenopause     Subjective        Tanya Brunner presents to [facility]  History of Present Illness  The patient presents today for a 6-month follow-up on her blood pressure and other concerns.    Hypertension.  The patient's blood pressure today is 122/76 mmHg. She denies any issues with taking her chlorthalidone. Patient takes half of a tablet daily. She denies any chest pain or shortness of breath.    Neuritis.  Ms. Brunner reports numbness in her arms when she raises her hand or when she is driving. She also reports numbness when she sleeps and puts her arms above her head or her heart. The patient sleeps on her left side, and most of the time, she sleeps on her left side. Patient denies any neck pain. She adds that her numbness always presents on both sides of her body.     Joint pain.  The patient states her joints feel like they flare up more the longer she is on her feet. She denies any family history of autoimmune diseases. The patient takes Aleve for her joint pain, which helps.     Perimenopause  Ms. Brunner has a history of a partial hysterectomy. She denies any hot flashes or night sweats.    External Hemorrhoids.  Patient reports 2 to 3 months ago, her hemorrhoids came out. The pain has improved within the last 2 months. Her stools go from soft to hard. Patient reports difficulty having a bowel movement. Her hemorrhoids are not painful when she touches them. She denies any blood in her stool. She has tried hemorrhoid cream. She states she has stomach issues if she eats too much [employer] or wheat.     Objective    A review of systems was performed, and the pertinent positives are noted in the HPI.  Vital Signs:  /76   Pulse 74   Temp 97.3 °F (36.3 °C)   Ht 160 cm (63\")   Wt 69.8 kg (153 lb 12.8 oz)   SpO2 98%   BMI 27.24 kg/m²   Estimated body mass index is 27.24 kg/m² as calculated from the  following:    Height as of this encounter: 160 cm (63\").    Weight as of this encounter: 69.8 kg (153 lb 12.8 oz).          Physical Exam  Constitutional:       General: She is not in acute distress.     Appearance: Normal appearance. She is well-developed.   HENT:      Head: Normocephalic and atraumatic.      Right Ear: Tympanic membrane, ear canal and external ear normal.      Left Ear: Tympanic membrane, ear canal and external ear normal.      Mouth/Throat:      Mouth: Mucous membranes are moist.      Pharynx: Oropharynx is clear.   Eyes:      Conjunctiva/sclera: Conjunctivae normal.      Pupils: Pupils are equal, round, and reactive to light.   Neck:      Thyroid: No thyromegaly.   Cardiovascular:      Rate and Rhythm: Normal rate and regular rhythm.      Heart sounds: No murmur heard.  Pulmonary:      Effort: Pulmonary effort is normal.      Breath sounds: Normal breath sounds. No wheezing.   Abdominal:      General: Bowel sounds are normal.      Palpations: Abdomen is soft.      Tenderness: There is no abdominal tenderness.   Genitourinary:     Comments: circumferential mostly on the left healed external hemorrhoids.  Musculoskeletal:         General: Normal range of motion.      Cervical back: Neck supple.   Lymphadenopathy:      Cervical: No cervical adenopathy.   Skin:     General: Skin is warm and dry.   Neurological:      Mental Status: She is alert and oriented to person, place, and time.   Psychiatric:         Mood and Affect: Mood normal.         Behavior: Behavior normal.        Result Review :                Assessment and Plan   Diagnoses and all orders for this visit:    1. Essential hypertension (Primary)  -     Comprehensive Metabolic Panel  -     atenolol-chlorthalidone (TENORETIC) 50-25 MG per tablet; Take 0.5 tablets by mouth Daily.  Dispense: 45 tablet; Refill: 3    2. Neuritis  -     TSH  -     Vitamin B12    3. Arthralgia, unspecified joint  -     CORA by IFA, Reflex 9-biomarkers profile  -      C-reactive Protein  -     Rheumatoid Factor, Quant    4. External hemorrhoids    Hypertension-controlled, continue current medication check labs neuritis-we will add TSH to build 12  Arthralgia-we will check inflammatory markers  External hemorrhoids-discussed taking fiber daily         Follow Up   Return in about 6 months (around 7/6/2023) for Annual physical.  Patient was given instructions and counseling regarding her condition or for health maintenance advice. Please see specific information pulled into the AVS if appropriate.       Answers for HPI/ROS submitted by the patient on 1/5/2023  Please describe your symptoms.: Check up.     Still think I’m in the direction of menopause,  fatigue, joints are flared up, hair falling out, sweating at night, no sex drive,  or it’s called getting old and fast.   Another thing I’m having is when my hands and arms are above my heart when I’m driving for along period of time they are numb.  It happens more at night.                                                         Having issues with my hemorrhoids  Have you had these symptoms before?: Yes  How long have you been having these symptoms?: Greater than 2 weeks  Please list any medications you are currently taking for this condition.: Atenolol D3 Montelukast, kamala  Please describe any probable cause for these symptoms. : Blood pressure.  Vitamin.   Allergies.    Stomach  What is the primary reason for your visit?: Other    Transcribed from ambient dictation for Meredith Lea Kehrer, MD by Sierra [place].  01/06/23   09:16 EST    Patient or patient representative verbalized consent to the visit recording.  I have personally performed the services described in this document as transcribed by the above individual, and it is both accurate and complete.

## 2023-01-10 LAB
ALBUMIN SERPL-MCNC: 4.1 G/DL (ref 3.5–5.2)
ALBUMIN/GLOB SERPL: 1.4 G/DL
ALP SERPL-CCNC: 44 U/L (ref 39–117)
ALT SERPL-CCNC: 33 U/L (ref 1–33)
ANA SER QL IF: NEGATIVE
AST SERPL-CCNC: 30 U/L (ref 1–32)
BILIRUB SERPL-MCNC: 0.3 MG/DL (ref 0–1.2)
BUN SERPL-MCNC: 11 MG/DL (ref 6–20)
BUN/CREAT SERPL: 14.3 (ref 7–25)
CALCIUM SERPL-MCNC: 9.4 MG/DL (ref 8.6–10.5)
CHLORIDE SERPL-SCNC: 100 MMOL/L (ref 98–107)
CO2 SERPL-SCNC: 26.8 MMOL/L (ref 22–29)
CREAT SERPL-MCNC: 0.77 MG/DL (ref 0.57–1)
CRP SERPL-MCNC: 0.61 MG/DL (ref 0–0.5)
EGFRCR SERPLBLD CKD-EPI 2021: 92.4 ML/MIN/1.73
GLOBULIN SER CALC-MCNC: 2.9 GM/DL
GLUCOSE SERPL-MCNC: 91 MG/DL (ref 65–99)
LABORATORY COMMENT REPORT: NORMAL
POTASSIUM SERPL-SCNC: 3.7 MMOL/L (ref 3.5–5.2)
PROT SERPL-MCNC: 7 G/DL (ref 6–8.5)
RHEUMATOID FACT SERPL-ACNC: 18.1 IU/ML
SODIUM SERPL-SCNC: 139 MMOL/L (ref 136–145)
TSH SERPL DL<=0.005 MIU/L-ACNC: 2.59 UIU/ML (ref 0.27–4.2)
VIT B12 SERPL-MCNC: 810 PG/ML (ref 211–946)

## 2023-01-11 LAB
CCP IGA+IGG SERPL IA-ACNC: 8 UNITS (ref 0–19)
WRITTEN AUTHORIZATION: NORMAL

## 2023-01-17 DIAGNOSIS — M25.50 ARTHRALGIA, UNSPECIFIED JOINT: Primary | ICD-10-CM

## 2023-01-17 DIAGNOSIS — R76.8 ELEVATED RHEUMATOID FACTOR: ICD-10-CM

## 2023-07-10 PROBLEM — I10 ESSENTIAL HYPERTENSION: Status: ACTIVE | Noted: 2023-07-10

## 2024-01-10 ENCOUNTER — OFFICE VISIT (OUTPATIENT)
Dept: FAMILY MEDICINE CLINIC | Facility: CLINIC | Age: 54
End: 2024-01-10
Payer: COMMERCIAL

## 2024-01-10 ENCOUNTER — TELEPHONE (OUTPATIENT)
Dept: SURGERY | Facility: CLINIC | Age: 54
End: 2024-01-10
Payer: COMMERCIAL

## 2024-01-10 VITALS
SYSTOLIC BLOOD PRESSURE: 110 MMHG | HEART RATE: 61 BPM | WEIGHT: 148 LBS | HEIGHT: 63 IN | OXYGEN SATURATION: 97 % | TEMPERATURE: 98.3 F | BODY MASS INDEX: 26.22 KG/M2 | DIASTOLIC BLOOD PRESSURE: 68 MMHG

## 2024-01-10 DIAGNOSIS — J30.1 SEASONAL ALLERGIC RHINITIS DUE TO POLLEN: ICD-10-CM

## 2024-01-10 DIAGNOSIS — N60.01 BILATERAL BREAST CYSTS: ICD-10-CM

## 2024-01-10 DIAGNOSIS — D75.89 MACROCYTOSIS: ICD-10-CM

## 2024-01-10 DIAGNOSIS — I10 ESSENTIAL HYPERTENSION: Primary | ICD-10-CM

## 2024-01-10 DIAGNOSIS — N60.02 BILATERAL BREAST CYSTS: ICD-10-CM

## 2024-01-10 DIAGNOSIS — R79.89 ELEVATED LFTS: ICD-10-CM

## 2024-01-10 DIAGNOSIS — N60.02 BENIGN BREAST CYST IN FEMALE, LEFT: Primary | ICD-10-CM

## 2024-01-10 LAB
ALBUMIN SERPL-MCNC: 4.2 G/DL (ref 3.5–5.2)
ALBUMIN/GLOB SERPL: 1.5 G/DL
ALP SERPL-CCNC: 41 U/L (ref 39–117)
ALT SERPL-CCNC: 23 U/L (ref 1–33)
AST SERPL-CCNC: 20 U/L (ref 1–32)
BASOPHILS # BLD AUTO: 0.05 10*3/MM3 (ref 0–0.2)
BASOPHILS NFR BLD AUTO: 0.8 % (ref 0–1.5)
BILIRUB SERPL-MCNC: 0.5 MG/DL (ref 0–1.2)
BUN SERPL-MCNC: 13 MG/DL (ref 6–20)
BUN/CREAT SERPL: 15.5 (ref 7–25)
CALCIUM SERPL-MCNC: 10 MG/DL (ref 8.6–10.5)
CHLORIDE SERPL-SCNC: 99 MMOL/L (ref 98–107)
CO2 SERPL-SCNC: 25.7 MMOL/L (ref 22–29)
CREAT SERPL-MCNC: 0.84 MG/DL (ref 0.57–1)
EGFRCR SERPLBLD CKD-EPI 2021: 82.7 ML/MIN/1.73
EOSINOPHIL # BLD AUTO: 0.11 10*3/MM3 (ref 0–0.4)
EOSINOPHIL NFR BLD AUTO: 1.8 % (ref 0.3–6.2)
ERYTHROCYTE [DISTWIDTH] IN BLOOD BY AUTOMATED COUNT: 11.8 % (ref 12.3–15.4)
GLOBULIN SER CALC-MCNC: 2.8 GM/DL
GLUCOSE SERPL-MCNC: 84 MG/DL (ref 65–99)
HCT VFR BLD AUTO: 36.9 % (ref 34–46.6)
HGB BLD-MCNC: 13.3 G/DL (ref 12–15.9)
IMM GRANULOCYTES # BLD AUTO: 0.01 10*3/MM3 (ref 0–0.05)
IMM GRANULOCYTES NFR BLD AUTO: 0.2 % (ref 0–0.5)
LYMPHOCYTES # BLD AUTO: 2.23 10*3/MM3 (ref 0.7–3.1)
LYMPHOCYTES NFR BLD AUTO: 35.9 % (ref 19.6–45.3)
MCH RBC QN AUTO: 36.2 PG (ref 26.6–33)
MCHC RBC AUTO-ENTMCNC: 36 G/DL (ref 31.5–35.7)
MCV RBC AUTO: 100.5 FL (ref 79–97)
MONOCYTES # BLD AUTO: 0.63 10*3/MM3 (ref 0.1–0.9)
MONOCYTES NFR BLD AUTO: 10.1 % (ref 5–12)
NEUTROPHILS # BLD AUTO: 3.18 10*3/MM3 (ref 1.7–7)
NEUTROPHILS NFR BLD AUTO: 51.2 % (ref 42.7–76)
NRBC BLD AUTO-RTO: 0 /100 WBC (ref 0–0.2)
PLATELET # BLD AUTO: 283 10*3/MM3 (ref 140–450)
POTASSIUM SERPL-SCNC: 3.8 MMOL/L (ref 3.5–5.2)
PROT SERPL-MCNC: 7 G/DL (ref 6–8.5)
RBC # BLD AUTO: 3.67 10*6/MM3 (ref 3.77–5.28)
SODIUM SERPL-SCNC: 136 MMOL/L (ref 136–145)
WBC # BLD AUTO: 6.21 10*3/MM3 (ref 3.4–10.8)

## 2024-01-10 PROCEDURE — 99214 OFFICE O/P EST MOD 30 MIN: CPT | Performed by: FAMILY MEDICINE

## 2024-01-10 RX ORDER — FLUTICASONE PROPIONATE 50 MCG
2 SPRAY, SUSPENSION (ML) NASAL DAILY
Qty: 16 G | Refills: 5 | Status: SHIPPED | OUTPATIENT
Start: 2024-01-10

## 2024-01-10 RX ORDER — MAGNESIUM CARB/ALUMINUM HYDROX 105-160MG
TABLET,CHEWABLE ORAL ONCE
COMMUNITY

## 2024-01-10 NOTE — PROGRESS NOTES
"Chief Complaint  Med Refill, Hypertension, and Breast Pain (Left breast cyst  needs to be drained)    Subjective        Tanya Brunner presents to [facility]  History of Present Illness  Patient presents for 6-month follow-up on her hypertension.  Her last labs showed slightly elevated LFTs and macrocytosis.  Her B12 and folate were normal.  She denies drinking any alcohol and had not taken much of Tylenol either.  She is compliant with her blood pressure medication and doing well.  She is up-to-date with her mammogram but her left breast has had a painful cyst recently.    She has refused drainage of that in the past but would like to have it checked now.      Objective   Vital Signs:  /68   Pulse 61   Temp 98.3 °F (36.8 °C)   Ht 160 cm (63\")   Wt 67.1 kg (148 lb)   SpO2 97%   BMI 26.22 kg/m²   Estimated body mass index is 26.22 kg/m² as calculated from the following:    Height as of this encounter: 160 cm (63\").    Weight as of this encounter: 67.1 kg (148 lb).             Physical Exam  Constitutional:       General: She is not in acute distress.     Appearance: Normal appearance. She is well-developed.   HENT:      Head: Normocephalic and atraumatic.      Right Ear: Tympanic membrane, ear canal and external ear normal.      Left Ear: Tympanic membrane, ear canal and external ear normal.      Mouth/Throat:      Mouth: Mucous membranes are moist.      Pharynx: Oropharynx is clear.   Eyes:      Conjunctiva/sclera: Conjunctivae normal.      Pupils: Pupils are equal, round, and reactive to light.   Neck:      Thyroid: No thyromegaly.   Cardiovascular:      Rate and Rhythm: Normal rate and regular rhythm.      Heart sounds: No murmur heard.  Pulmonary:      Effort: Pulmonary effort is normal.      Breath sounds: Normal breath sounds. No wheezing.   Chest:       Musculoskeletal:         General: Normal range of motion.      Cervical back: Neck supple.   Lymphadenopathy:      " Cervical: No cervical adenopathy.   Skin:     General: Skin is warm and dry.   Neurological:      Mental Status: She is alert and oriented to person, place, and time.   Psychiatric:         Mood and Affect: Mood normal.         Behavior: Behavior normal.        Result Review :                   Assessment and Plan   Diagnoses and all orders for this visit:    1. Essential hypertension (Primary)  -     Comprehensive Metabolic Panel    2. Elevated LFTs  -     Comprehensive Metabolic Panel    3. Bilateral breast cysts  -     Ambulatory Referral to Breast Surgery    4. Macrocytosis  -     CBC & Differential    5. Seasonal allergic rhinitis due to pollen  -     fluticasone (FLONASE) 50 MCG/ACT nasal spray; 2 sprays into the nostril(s) as directed by provider Daily.  Dispense: 16 g; Refill: 5    Hypertension-recheck labs today continue current medication symptoms well-controlled  Elevated LFTs-check labs again  Macrocytosis-recheck  Breast cyst-we will get her into breast surgery for symptomatic relief and follow-up         Follow Up   Return in about 6 months (around 7/10/2024) for Annual physical.  Patient was given instructions and counseling regarding her condition or for health maintenance advice. Please see specific information pulled into the AVS if appropriate.         Answers submitted by the patient for this visit:  Other (Submitted on 1/8/2024)  Please describe your symptoms.: Check up on my blood pressure. Cyst on left breast is getting to be painful at times.  Have you had these symptoms before?: Yes  How long have you been having these symptoms?: Greater than 2 weeks  Primary Reason for Visit (Submitted on 1/8/2024)  What is the primary reason for your visit?: Other

## 2024-01-31 ENCOUNTER — HOSPITAL ENCOUNTER (OUTPATIENT)
Dept: MAMMOGRAPHY | Facility: HOSPITAL | Age: 54
Discharge: HOME | End: 2024-01-31
Payer: COMMERCIAL

## 2024-01-31 ENCOUNTER — HOSPITAL ENCOUNTER (OUTPATIENT)
Dept: ULTRASOUND IMAGING | Facility: HOSPITAL | Age: 54
Discharge: HOME | End: 2024-01-31
Payer: COMMERCIAL

## 2024-01-31 DIAGNOSIS — N60.02 BENIGN BREAST CYST IN FEMALE, LEFT: ICD-10-CM

## 2024-01-31 PROCEDURE — 77065 DX MAMMO INCL CAD UNI: CPT

## 2024-01-31 PROCEDURE — 76642 ULTRASOUND BREAST LIMITED: CPT

## 2024-01-31 PROCEDURE — G0279 TOMOSYNTHESIS, MAMMO: HCPCS

## 2024-02-08 ENCOUNTER — TELEPHONE (OUTPATIENT)
Dept: SURGERY | Facility: CLINIC | Age: 54
End: 2024-02-08
Payer: COMMERCIAL

## 2024-02-08 NOTE — TELEPHONE ENCOUNTER
Patient canceled her consult with Dr. Hope via the reminder system and has not returned our phone calls to reschedule or discuss her plan of care.     Three VM have been left for patient.

## 2024-07-10 DIAGNOSIS — I10 ESSENTIAL HYPERTENSION: ICD-10-CM

## 2024-07-10 RX ORDER — ATENOLOL AND CHLORTHALIDONE TABLET 50; 25 MG/1; MG/1
0.5 TABLET ORAL DAILY
Qty: 45 TABLET | Refills: 0 | Status: SHIPPED | OUTPATIENT
Start: 2024-07-10

## 2024-07-11 ENCOUNTER — OFFICE VISIT (OUTPATIENT)
Dept: FAMILY MEDICINE CLINIC | Facility: CLINIC | Age: 54
End: 2024-07-11
Payer: COMMERCIAL

## 2024-07-11 VITALS
SYSTOLIC BLOOD PRESSURE: 104 MMHG | BODY MASS INDEX: 25.91 KG/M2 | HEIGHT: 63 IN | WEIGHT: 146.2 LBS | TEMPERATURE: 98.4 F | DIASTOLIC BLOOD PRESSURE: 72 MMHG | HEART RATE: 78 BPM | OXYGEN SATURATION: 99 %

## 2024-07-11 DIAGNOSIS — N95.1 HOT FLASHES DUE TO MENOPAUSE: ICD-10-CM

## 2024-07-11 DIAGNOSIS — J30.1 SEASONAL ALLERGIC RHINITIS DUE TO POLLEN: ICD-10-CM

## 2024-07-11 DIAGNOSIS — I10 ESSENTIAL HYPERTENSION: ICD-10-CM

## 2024-07-11 DIAGNOSIS — Z00.00 ADULT GENERAL MEDICAL EXAMINATION: Primary | ICD-10-CM

## 2024-07-11 LAB
ALBUMIN SERPL-MCNC: 4.3 G/DL (ref 3.5–5.2)
ALBUMIN/GLOB SERPL: 1.5 G/DL
ALP SERPL-CCNC: 53 U/L (ref 39–117)
ALT SERPL-CCNC: 40 U/L (ref 1–33)
AST SERPL-CCNC: 32 U/L (ref 1–32)
BASOPHILS # BLD AUTO: 0.04 10*3/MM3 (ref 0–0.2)
BASOPHILS NFR BLD AUTO: 0.6 % (ref 0–1.5)
BILIRUB SERPL-MCNC: 0.5 MG/DL (ref 0–1.2)
BUN SERPL-MCNC: 13 MG/DL (ref 6–20)
BUN/CREAT SERPL: 17.8 (ref 7–25)
CALCIUM SERPL-MCNC: 9.9 MG/DL (ref 8.6–10.5)
CHLORIDE SERPL-SCNC: 99 MMOL/L (ref 98–107)
CHOLEST SERPL-MCNC: 207 MG/DL (ref 0–200)
CO2 SERPL-SCNC: 29 MMOL/L (ref 22–29)
CREAT SERPL-MCNC: 0.73 MG/DL (ref 0.57–1)
EGFRCR SERPLBLD CKD-EPI 2021: 97.9 ML/MIN/1.73
EOSINOPHIL # BLD AUTO: 0.16 10*3/MM3 (ref 0–0.4)
EOSINOPHIL NFR BLD AUTO: 2.4 % (ref 0.3–6.2)
ERYTHROCYTE [DISTWIDTH] IN BLOOD BY AUTOMATED COUNT: 12.3 % (ref 12.3–15.4)
GLOBULIN SER CALC-MCNC: 2.8 GM/DL
GLUCOSE SERPL-MCNC: 81 MG/DL (ref 65–99)
HCT VFR BLD AUTO: 38.5 % (ref 34–46.6)
HDLC SERPL-MCNC: 43 MG/DL (ref 40–60)
HGB BLD-MCNC: 13.6 G/DL (ref 12–15.9)
IMM GRANULOCYTES # BLD AUTO: 0.01 10*3/MM3 (ref 0–0.05)
IMM GRANULOCYTES NFR BLD AUTO: 0.1 % (ref 0–0.5)
LDLC SERPL CALC-MCNC: 139 MG/DL (ref 0–100)
LYMPHOCYTES # BLD AUTO: 2.84 10*3/MM3 (ref 0.7–3.1)
LYMPHOCYTES NFR BLD AUTO: 41.8 % (ref 19.6–45.3)
MCH RBC QN AUTO: 36.1 PG (ref 26.6–33)
MCHC RBC AUTO-ENTMCNC: 35.3 G/DL (ref 31.5–35.7)
MCV RBC AUTO: 102.1 FL (ref 79–97)
MONOCYTES # BLD AUTO: 0.65 10*3/MM3 (ref 0.1–0.9)
MONOCYTES NFR BLD AUTO: 9.6 % (ref 5–12)
NEUTROPHILS # BLD AUTO: 3.1 10*3/MM3 (ref 1.7–7)
NEUTROPHILS NFR BLD AUTO: 45.5 % (ref 42.7–76)
NRBC BLD AUTO-RTO: 0 /100 WBC (ref 0–0.2)
PLATELET # BLD AUTO: 250 10*3/MM3 (ref 140–450)
POTASSIUM SERPL-SCNC: 3.8 MMOL/L (ref 3.5–5.2)
PROT SERPL-MCNC: 7.1 G/DL (ref 6–8.5)
RBC # BLD AUTO: 3.77 10*6/MM3 (ref 3.77–5.28)
SODIUM SERPL-SCNC: 140 MMOL/L (ref 136–145)
TRIGL SERPL-MCNC: 137 MG/DL (ref 0–150)
TSH SERPL DL<=0.005 MIU/L-ACNC: 1.76 UIU/ML (ref 0.27–4.2)
VLDLC SERPL CALC-MCNC: 25 MG/DL (ref 5–40)
WBC # BLD AUTO: 6.8 10*3/MM3 (ref 3.4–10.8)

## 2024-07-11 PROCEDURE — 99396 PREV VISIT EST AGE 40-64: CPT | Performed by: FAMILY MEDICINE

## 2024-07-11 RX ORDER — VENLAFAXINE HYDROCHLORIDE 37.5 MG/1
37.5 CAPSULE, EXTENDED RELEASE ORAL DAILY
Qty: 90 CAPSULE | Refills: 1 | Status: SHIPPED | OUTPATIENT
Start: 2024-07-11

## 2024-07-11 RX ORDER — BLACK COHOSH ROOT 540 MG
CAPSULE ORAL
COMMUNITY

## 2024-07-11 RX ORDER — ACETYLCYSTEINE 600 MG
CAPSULE ORAL
COMMUNITY

## 2024-07-11 NOTE — PROGRESS NOTES
Subjective   Tanya Brunner is a 53 y.o. female who presents for annual female wellness exam.  Chief Complaint   Patient presents with    Annual Exam        History of Present Illness  The patient is a 53-year-old female who presents for annual exam and follow-up on hypertension and allergies.    The patient has been adhering to her prescribed antihypertensive regimen. She denies experiencing headaches, chest pain, or dyspnea. She maintains a low-sodium diet.    The patient experiences allergy symptoms during outdoor activities such as mowing the lawn. She uses Flonase sporadically.    The patient reports an increase in energy levels. She experiences severe mood swings, often leading to crying episodes. She also reports bone pain, which she attributes to her age. She experiences night sweats and hot flashes. She has previously tried a low-dose antidepressant, the name of which she can not recall, but discontinued its use due to adverse effects. She prefers to take vitamins rather than medication due to concerns about potential side effects. She consumes a significant amount of sugar and has noticed foot discomfort when consuming excessive sugar or carbonated beverages. She denies experiencing chest pain, nausea, vomiting, changes in bowel or bladder function, or concerning skin spots. She occasionally uses sunblock.   She does not drink alcohol.       Menstrual History: menopause  Pregnancy History:   Sexual History: with spouse   Contraception: na  Hormone Replacement Therapy: na  Diet: standard  Exercise: yes, farm work  Do you feel safe? yes  Have you ever been abused? no  Last dental exam: up to date  Last eye exam: up to date    Mammogram: 2024  Pap Smear: na  Bone Density: na  Colon Cancer Screenin    Immunization History   Administered Date(s) Administered    Fluzone Quad >6mos (Multi-dose) 2018    Tdap 07/10/2023       The following portions of the patient's history were reviewed and  updated as appropriate: allergies, current medications, past family history, past medical history, past social history, past surgical history and problem list.    Past Medical History:   Diagnosis Date    Allergic     Fibrocystic breast     Hypertension        Past Surgical History:   Procedure Laterality Date    COLONOSCOPY  2019    INGUINAL HERNIA REPAIR Left 1974    LAPAROSCOPIC VAGINAL HYSTERECTOMY  2010    OVARIAN CYST REMOVAL Left     SUBTOTAL HYSTERECTOMY         Family History   Problem Relation Age of Onset    Cancer Mother     Hyperlipidemia Mother     Kidney disease Mother     Alcohol abuse Brother         Passed away at 40    Hyperlipidemia Brother        Social History     Socioeconomic History    Marital status:    Tobacco Use    Smoking status: Never    Smokeless tobacco: Never   Vaping Use    Vaping status: Never Used   Substance and Sexual Activity    Alcohol use: Not Currently     Comment: Once in a blue moon    Drug use: Never    Sexual activity: Defer         Current Outpatient Medications:     atenolol-chlorthalidone (TENORETIC) 50-25 MG per tablet, TAKE 1/2 TABLET BY MOUTH DAILY, Disp: 45 tablet, Rfl: 0    Black Cohosh 540 MG capsule, , Disp: , Rfl:     cholecalciferol (VITAMIN D3) 10 MCG (400 UNIT) tablet, Take 1 tablet by mouth Daily., Disp: , Rfl:     Collagen-Vitamin C-Biotin (COLLAGEN 1500/C PO), Take  by mouth., Disp: , Rfl:     fluticasone (FLONASE) 50 MCG/ACT nasal spray, 2 sprays into the nostril(s) as directed by provider Daily., Disp: 16 g, Rfl: 5    Homeopathic Products (ALLERGY MEDICINE PO), , Disp: , Rfl:     magnesium citrate 1.745 GM/30ML solution solution, Take  by mouth 1 (One) Time., Disp: , Rfl:     montelukast (SINGULAIR) 10 MG tablet, Take 1 tablet by mouth every night at bedtime., Disp: 90 tablet, Rfl: 3    Specialty Vitamins Products (MemorAll) capsule, , Disp: , Rfl:     vitamin B-12 (CYANOCOBALAMIN) 100 MCG tablet, Take 0.5 tablets by mouth Daily., Disp: , Rfl:  "    venlafaxine XR (Effexor XR) 37.5 MG 24 hr capsule, Take 1 capsule by mouth Daily., Disp: 90 capsule, Rfl: 1    Review of Systems    Objective   Vitals:    07/11/24 0825   BP: 104/72   Pulse: 78   Temp: 98.4 °F (36.9 °C)   SpO2: 99%   Weight: 66.3 kg (146 lb 3.2 oz)   Height: 160 cm (62.99\")     Body mass index is 25.9 kg/m².  Physical Exam  Vitals and nursing note reviewed.   Constitutional:       General: She is not in acute distress.     Appearance: Normal appearance. She is well-developed.   HENT:      Head: Normocephalic and atraumatic.      Right Ear: Tympanic membrane, ear canal and external ear normal.      Left Ear: Tympanic membrane, ear canal and external ear normal.      Nose: Nose normal.      Mouth/Throat:      Mouth: Mucous membranes are moist.      Pharynx: Oropharynx is clear. No oropharyngeal exudate or posterior oropharyngeal erythema.   Eyes:      Conjunctiva/sclera: Conjunctivae normal.      Pupils: Pupils are equal, round, and reactive to light.   Neck:      Thyroid: No thyromegaly.   Cardiovascular:      Rate and Rhythm: Normal rate and regular rhythm.      Heart sounds: No murmur heard.  Pulmonary:      Effort: Pulmonary effort is normal.      Breath sounds: Normal breath sounds. No wheezing.   Abdominal:      General: Abdomen is flat. Bowel sounds are normal. There is no distension.      Palpations: Abdomen is soft. There is no mass.      Tenderness: There is no abdominal tenderness.      Hernia: No hernia is present.   Musculoskeletal:         General: No swelling. Normal range of motion.      Cervical back: Normal range of motion and neck supple.      Right lower leg: No edema.      Left lower leg: No edema.   Lymphadenopathy:      Cervical: No cervical adenopathy.   Skin:     General: Skin is warm and dry.      Capillary Refill: Capillary refill takes less than 2 seconds.      Findings: No rash.   Neurological:      General: No focal deficit present.      Mental Status: She is alert " and oriented to person, place, and time.      Cranial Nerves: No cranial nerve deficit.   Psychiatric:         Mood and Affect: Mood normal.         Behavior: Behavior normal.       Physical Exam         Results  Laboratory Studies  Liver enzymes were elevated.       Assessment & Plan   Diagnoses and all orders for this visit:    1. Adult general medical examination (Primary)  -     TSH  -     Lipid Panel  -     CBC & Differential  -     Comprehensive Metabolic Panel    2. Essential hypertension  -     TSH  -     Lipid Panel  -     CBC & Differential  -     Comprehensive Metabolic Panel    3. Seasonal allergic rhinitis due to pollen    4. Hot flashes due to menopause  -     venlafaxine XR (Effexor XR) 37.5 MG 24 hr capsule; Take 1 capsule by mouth Daily.  Dispense: 90 capsule; Refill: 1      Assessment & Plan  1. Annual exam.  A comprehensive panel of laboratory tests will be ordered.    2. Allergies.  Continue current treatment.    3. Hot flashes.  Venlafaxine will be initiated at the lowest dose.  Patient is to let me know if she has any side effects.    Follow-up  A follow-up visit is scheduled for 3 months from now.            Discussed the importance of maintaining a healthy weight and getting regular exercise.  Educated patient on the benefits of healthy diet.  Advise follow-up annually for wellness exams.    There are no Patient Instructions on file for this visit.    Patient or patient representative verbalized consent for the use of Ambient Listening during the visit with  Meredith Lea Kehrer, MD for chart documentation. 7/11/2024  09:00 EDT

## 2024-07-15 LAB
FOLATE SERPL-MCNC: >20 NG/ML (ref 4.78–24.2)
VIT B12 SERPL-MCNC: 1764 PG/ML (ref 211–946)
WRITTEN AUTHORIZATION: NORMAL

## 2024-10-06 DIAGNOSIS — I10 ESSENTIAL HYPERTENSION: ICD-10-CM

## 2024-10-07 RX ORDER — ATENOLOL AND CHLORTHALIDONE TABLET 50; 25 MG/1; MG/1
0.5 TABLET ORAL DAILY
Qty: 45 TABLET | Refills: 0 | Status: SHIPPED | OUTPATIENT
Start: 2024-10-07

## 2024-10-14 ENCOUNTER — OFFICE VISIT (OUTPATIENT)
Dept: FAMILY MEDICINE CLINIC | Facility: CLINIC | Age: 54
End: 2024-10-14
Payer: COMMERCIAL

## 2024-10-14 VITALS
HEART RATE: 67 BPM | WEIGHT: 142.6 LBS | HEIGHT: 63 IN | BODY MASS INDEX: 25.27 KG/M2 | SYSTOLIC BLOOD PRESSURE: 110 MMHG | OXYGEN SATURATION: 97 % | DIASTOLIC BLOOD PRESSURE: 68 MMHG

## 2024-10-14 DIAGNOSIS — G89.29 CHRONIC THUMB PAIN, LEFT: ICD-10-CM

## 2024-10-14 DIAGNOSIS — J30.1 SEASONAL ALLERGIC RHINITIS DUE TO POLLEN: ICD-10-CM

## 2024-10-14 DIAGNOSIS — M79.645 CHRONIC THUMB PAIN, LEFT: ICD-10-CM

## 2024-10-14 DIAGNOSIS — N95.1 HOT FLASHES DUE TO MENOPAUSE: ICD-10-CM

## 2024-10-14 DIAGNOSIS — I10 ESSENTIAL HYPERTENSION: Primary | ICD-10-CM

## 2024-10-14 PROCEDURE — 99213 OFFICE O/P EST LOW 20 MIN: CPT | Performed by: FAMILY MEDICINE

## 2024-10-14 NOTE — PROGRESS NOTES
"Chief Complaint  Hypertension and Hot Flashes    Subjective        Tanya Brunner presents to [facility]  History of Present Illness  History of Present Illness  The patient is a 53-year-old female who presents for a 3-month follow-up on hot flashes.    She reports that the low dose of venlafaxine has been beneficial in managing her hot flashes, which have improved overall. However, she notes a resurgence of symptoms over the past two weeks due to increased stress. She acknowledges that her symptoms worsen under stress but continues to take her medication. She has found magnesium salt baths helpful and maintains a regular exercise routine. She expresses a preference for minimal medication use.     She mentions that the medication has positively impacted her bone health, with no current symptoms in her knees. Despite this, she experiences pain in her left hand, which she attributes to frequent use as she is right-handed. She has not attempted to alleviate the pain with ice.    She identifies her  as a significant source of stress but feels she has developed coping mechanisms. She finds comfort in her pets, including three dogs and chickens.    She is seeking advice on effective allergy medications. She has been using Flonase and has consulted a dentist who suggested her gum swelling might be allergy-related. The swelling is severe enough to interfere with her eating. She also experiences itching and burning in her eyes. Her current medications include montelukast and Flonase.       Objective   Vital Signs:  /68   Pulse 67   Ht 160 cm (62.99\")   Wt 64.7 kg (142 lb 9.6 oz)   SpO2 97%   BMI 25.27 kg/m²   Estimated body mass index is 25.27 kg/m² as calculated from the following:    Height as of this encounter: 160 cm (62.99\").    Weight as of this encounter: 64.7 kg (142 lb 9.6 oz).               Physical Exam   Physical Exam  Patient is alert, in no acute distress.  Pupils " are equal. Conjunctiva appears slightly injected.  Carpometacarpal joint exhibits tenderness.    Vital Signs  Blood pressure measures at 110/68.       Result Review :          Results                Assessment and Plan     Diagnoses and all orders for this visit:    1. Essential hypertension (Primary)    2. Hot flashes due to menopause    3. Seasonal allergic rhinitis due to pollen    4. Chronic thumb pain, left  Comments:  c-mcp arthritis      Assessment & Plan  1. Hot Flashes.  The patient reports improvement in hot flashes with the current low dose of venlafaxine, but symptoms have worsened over the past two weeks due to stress. Regular exercise and Epsom salt baths were recommended to help balance hormones and regulate cortisol levels. The possibility of increasing the venlafaxine dosage was discussed, but she prefers to stay on the current dose. She was advised to contact the office if she wishes to increase the dosage in the future. Stress management techniques, such as outdoor activities and baths, were also recommended.    2. Left Hand Pain (Arthritis).  She reports significant pain in her left hand, particularly in the left first MCP joint. Ice application for 20 minutes as needed was recommended to alleviate pain. A referral to a hand specialist for further evaluation and potential injection was discussed. She was advised to notify the office when she is ready to see the specialist.    3. Allergies.  She reports severe allergy symptoms affecting her gums and eyes. She was advised to continue using Flonase and to add either Xyzal or cetirizine. Optivar eyedrops were recommended for her eyes. If symptoms persist, she may consider older antihistamines like Benadryl or Claritin, to be taken at night due to their sedative effects.    Follow-up  Return in 3 months for a midyear checkup on hypertension.            Follow Up     Return in about 3 months (around 1/14/2025) for Recheck HTN.  Patient was given  instructions and counseling regarding her condition or for health maintenance advice. Please see specific information pulled into the AVS if appropriate.    Patient or patient representative verbalized consent for the use of Ambient Listening during the visit with  Meredith Lea Kehrer, MD for chart documentation. 10/14/2024  13:06 EDT

## 2025-01-08 DIAGNOSIS — N95.1 HOT FLASHES DUE TO MENOPAUSE: ICD-10-CM

## 2025-01-08 RX ORDER — VENLAFAXINE HYDROCHLORIDE 37.5 MG/1
37.5 CAPSULE, EXTENDED RELEASE ORAL DAILY
Qty: 90 CAPSULE | Refills: 1 | Status: SHIPPED | OUTPATIENT
Start: 2025-01-08

## 2025-01-08 NOTE — TELEPHONE ENCOUNTER
Rx Refill Note  Requested Prescriptions     Pending Prescriptions Disp Refills    venlafaxine XR (EFFEXOR-XR) 37.5 MG 24 hr capsule [Pharmacy Med Name: VENLAFAXINE HCL ER 37.5 MG CAP] 90 capsule 1     Sig: TAKE 1 CAPSULE BY MOUTH EVERY DAY      Last office visit with prescribing clinician: 10/14/2024   Last telemedicine visit with prescribing clinician: Visit date not found   Next office visit with prescribing clinician: 1/14/2025                         Would you like a call back once the refill request has been completed: [] Yes [] No    If the office needs to give you a call back, can they leave a voicemail: [] Yes [] No    Ludy Edmonds MA  01/08/25, 06:18 EST

## 2025-01-09 DIAGNOSIS — I10 ESSENTIAL HYPERTENSION: ICD-10-CM

## 2025-01-09 RX ORDER — ATENOLOL AND CHLORTHALIDONE TABLET 50; 25 MG/1; MG/1
0.5 TABLET ORAL DAILY
Qty: 45 TABLET | Refills: 0 | Status: SHIPPED | OUTPATIENT
Start: 2025-01-09

## 2025-01-14 ENCOUNTER — OFFICE VISIT (OUTPATIENT)
Dept: FAMILY MEDICINE CLINIC | Facility: CLINIC | Age: 55
End: 2025-01-14
Payer: COMMERCIAL

## 2025-01-14 VITALS
WEIGHT: 149.2 LBS | TEMPERATURE: 98.6 F | OXYGEN SATURATION: 97 % | DIASTOLIC BLOOD PRESSURE: 72 MMHG | SYSTOLIC BLOOD PRESSURE: 124 MMHG | HEART RATE: 64 BPM | BODY MASS INDEX: 26.44 KG/M2 | HEIGHT: 63 IN

## 2025-01-14 DIAGNOSIS — N95.1 HOT FLASHES DUE TO MENOPAUSE: ICD-10-CM

## 2025-01-14 DIAGNOSIS — D75.89 MACROCYTOSIS: ICD-10-CM

## 2025-01-14 DIAGNOSIS — I10 ESSENTIAL HYPERTENSION: Primary | ICD-10-CM

## 2025-01-14 DIAGNOSIS — E78.49 OTHER HYPERLIPIDEMIA: ICD-10-CM

## 2025-01-14 DIAGNOSIS — R42 LIGHTHEADEDNESS: ICD-10-CM

## 2025-01-14 DIAGNOSIS — R11.0 NAUSEA: ICD-10-CM

## 2025-01-14 PROCEDURE — 99214 OFFICE O/P EST MOD 30 MIN: CPT | Performed by: FAMILY MEDICINE

## 2025-01-14 RX ORDER — VENLAFAXINE HYDROCHLORIDE 75 MG/1
75 CAPSULE, EXTENDED RELEASE ORAL DAILY
Qty: 90 CAPSULE | Refills: 1 | Status: SHIPPED | OUTPATIENT
Start: 2025-01-14

## 2025-01-14 NOTE — PROGRESS NOTES
"Chief Complaint  Med Refill and Hypertension    Subjective        Tanya Brunner presents to [facility]  History of Present Illness  History of Present Illness  The patient is a 54-year-old female who presents for a 6-month follow-up on hypertension.    She reports experiencing severe hot flashes, describing them as feeling like her head, eyes, and body are akin to a volcano on the verge of eruption. These episodes are accompanied by profuse sweating. She also experiences mood swings, which she attributes to her menopausal symptoms. She has been advised against hormone therapy by her previous physician. She admits to consuming sugar and expresses concern about its potential impact on her symptoms. Additionally, she reports experiencing nausea when standing up after working, which she suspects may be related to her menopause. She does not experience any fainting spells but notes that the nausea is severe enough to necessitate sitting down. She reports no family history of breast cancer or any personal history of smoking or alcohol consumption. She is currently on Effexor 37.5 mg for these symptoms, but it appears to be ineffective as she continues to experience hot flashes at night and irritability during the day.    She has been avoiding water intake to minimize urination frequency at work.    SOCIAL HISTORY  She does not smoke or drink alcohol. She works on a farm.    FAMILY HISTORY  Her mother had lymphoma. No family history of breast cancer.    MEDICATIONS  Current: Effexor       Objective   Vital Signs:  /72   Pulse 64   Temp 98.6 °F (37 °C)   Ht 160 cm (63\")   Wt 67.7 kg (149 lb 3.2 oz)   SpO2 97%   BMI 26.43 kg/m²   Estimated body mass index is 26.43 kg/m² as calculated from the following:    Height as of this encounter: 160 cm (63\").    Weight as of this encounter: 67.7 kg (149 lb 3.2 oz).               Physical Exam   Physical Exam  The patient is alert and in no " acute distress.  Pupils are equal.  Lungs are clear.  Heart rhythm is regular.  There is no edema in the musculoskeletal system.    Vital Signs  Blood pressure is 124/72.       Result Review :          Results  Laboratory Studies  Red blood cell size was high, B12 and folate were normal. Cholesterol was slightly elevated.              Assessment and Plan     Diagnoses and all orders for this visit:    1. Essential hypertension (Primary)  -     Comprehensive Metabolic Panel  -     CBC & Differential    2. Macrocytosis  -     Vitamin B12  -     Folate    3. Hot flashes due to menopause  -     CBC & Differential    4. Other hyperlipidemia    5. Lightheadedness    6. Nausea  -     Comprehensive Metabolic Panel  -     Lipase    Other orders  -     venlafaxine XR (Effexor XR) 75 MG 24 hr capsule; Take 1 capsule by mouth Daily.  Dispense: 90 capsule; Refill: 1      Assessment & Plan  1. Menopausal symptoms.  She reports persistent hot flashes and irritability despite being on Effexor 37.5 mg. The potential benefits of hormone therapy were discussed, but given her mother's history of lymphoma, it was decided to avoid hormones. She is advised to reduce sugar intake as it may exacerbate hot flashes. The dosage of Effexor will be increased to 75 mg. She is instructed to take venlafaxine at the same time daily to avoid nausea. A follow-up appointment is scheduled in 6 weeks to assess the effectiveness of the increased venlafaxine dosage.    2. Hypertension.  Her blood pressure is well-controlled at 124/72 mmHg. She is currently on a diuretic beta blocker combination.  Orthostatics were negative.  She is advised to maintain adequate hydration, aiming for 80 ounces of water daily, especially given her medication regimen.    3.  Macrocytosis.  Previous labs showed elevated red blood cell size, although B12 and folate levels were normal. She does not consume alcohol. A recheck of her red blood cell size will be done today. If any  abnormalities are found, a referral to a hematologist will be considered.    4. Elevated cholesterol.  Her cholesterol levels were slightly elevated during the last visit. Fasting labs will be conducted during her annual visit to monitor her cholesterol levels.    Follow-up  The patient will follow up in 6 weeks.            Follow Up     Return in about 6 weeks (around 2/25/2025) for Recheck menopause (video OK).  Patient was given instructions and counseling regarding her condition or for health maintenance advice. Please see specific information pulled into the AVS if appropriate.    Patient or patient representative verbalized consent for the use of Ambient Listening during the visit with  Meredith Lea Kehrer, MD for chart documentation. 1/14/2025  08:43 EST

## 2025-01-15 LAB
ALBUMIN SERPL-MCNC: 4.3 G/DL (ref 3.8–4.9)
ALP SERPL-CCNC: 55 IU/L (ref 44–121)
ALT SERPL-CCNC: 29 IU/L (ref 0–32)
AST SERPL-CCNC: 31 IU/L (ref 0–40)
BASOPHILS # BLD AUTO: 0 X10E3/UL (ref 0–0.2)
BASOPHILS NFR BLD AUTO: 1 %
BILIRUB SERPL-MCNC: 0.3 MG/DL (ref 0–1.2)
BUN SERPL-MCNC: 14 MG/DL (ref 6–24)
BUN/CREAT SERPL: 21 (ref 9–23)
CALCIUM SERPL-MCNC: 9.9 MG/DL (ref 8.7–10.2)
CHLORIDE SERPL-SCNC: 99 MMOL/L (ref 96–106)
CO2 SERPL-SCNC: 27 MMOL/L (ref 20–29)
CREAT SERPL-MCNC: 0.67 MG/DL (ref 0.57–1)
EGFRCR SERPLBLD CKD-EPI 2021: 103 ML/MIN/1.73
EOSINOPHIL # BLD AUTO: 0.1 X10E3/UL (ref 0–0.4)
EOSINOPHIL NFR BLD AUTO: 1 %
ERYTHROCYTE [DISTWIDTH] IN BLOOD BY AUTOMATED COUNT: 12.1 % (ref 11.7–15.4)
FOLATE SERPL-MCNC: >20 NG/ML
GLOBULIN SER CALC-MCNC: 3.2 G/DL (ref 1.5–4.5)
GLUCOSE SERPL-MCNC: 88 MG/DL (ref 70–99)
HCT VFR BLD AUTO: 39.2 % (ref 34–46.6)
HGB BLD-MCNC: 13.6 G/DL (ref 11.1–15.9)
IMM GRANULOCYTES # BLD AUTO: 0 X10E3/UL (ref 0–0.1)
IMM GRANULOCYTES NFR BLD AUTO: 0 %
LIPASE SERPL-CCNC: 31 U/L (ref 14–72)
LYMPHOCYTES # BLD AUTO: 2.9 X10E3/UL (ref 0.7–3.1)
LYMPHOCYTES NFR BLD AUTO: 40 %
MCH RBC QN AUTO: 35.3 PG (ref 26.6–33)
MCHC RBC AUTO-ENTMCNC: 34.7 G/DL (ref 31.5–35.7)
MCV RBC AUTO: 102 FL (ref 79–97)
MONOCYTES # BLD AUTO: 0.6 X10E3/UL (ref 0.1–0.9)
MONOCYTES NFR BLD AUTO: 8 %
NEUTROPHILS # BLD AUTO: 3.7 X10E3/UL (ref 1.4–7)
NEUTROPHILS NFR BLD AUTO: 50 %
PLATELET # BLD AUTO: 268 X10E3/UL (ref 150–450)
POTASSIUM SERPL-SCNC: 3.9 MMOL/L (ref 3.5–5.2)
PROT SERPL-MCNC: 7.5 G/DL (ref 6–8.5)
RBC # BLD AUTO: 3.85 X10E6/UL (ref 3.77–5.28)
SODIUM SERPL-SCNC: 140 MMOL/L (ref 134–144)
VIT B12 SERPL-MCNC: 1349 PG/ML (ref 232–1245)
WBC # BLD AUTO: 7.3 X10E3/UL (ref 3.4–10.8)

## 2025-06-16 ENCOUNTER — TELEPHONE (OUTPATIENT)
Dept: FAMILY MEDICINE CLINIC | Facility: CLINIC | Age: 55
End: 2025-06-16

## 2025-06-16 NOTE — TELEPHONE ENCOUNTER
Caller: Brunner, Tanya    Relationship: Self    Best call back number: [phone]     What medication are you requesting:     What are your current symptoms: RIGHT LOWER BACK PAIN, PAINFUL AND FREQUENT URINATION, OVARY PAIN     How long have you been experiencing symptoms:     Have you had these symptoms before:    [] Yes  [] No    Have you been treated for these symptoms before:   [] Yes  [] No    If a prescription is needed, what is your preferred pharmacy and phone number: [id number] - [place], [place] - [address] [phone] [facility] [phone]      Additional notes:

## 2025-06-17 ENCOUNTER — OFFICE VISIT (OUTPATIENT)
Dept: FAMILY MEDICINE CLINIC | Facility: CLINIC | Age: 55
End: 2025-06-17
Payer: COMMERCIAL

## 2025-06-17 VITALS
DIASTOLIC BLOOD PRESSURE: 92 MMHG | WEIGHT: 144 LBS | TEMPERATURE: 98.1 F | HEIGHT: 63 IN | SYSTOLIC BLOOD PRESSURE: 158 MMHG | OXYGEN SATURATION: 98 % | BODY MASS INDEX: 25.52 KG/M2 | HEART RATE: 90 BPM

## 2025-06-17 DIAGNOSIS — N10 ACUTE PYELONEPHRITIS: Primary | ICD-10-CM

## 2025-06-17 DIAGNOSIS — R10.84 ACUTE GENERALIZED ABDOMINAL PAIN: ICD-10-CM

## 2025-06-17 DIAGNOSIS — R10.825 PERIUMBILICAL ABDOMINAL TENDERNESS WITH REBOUND TENDERNESS: ICD-10-CM

## 2025-06-17 LAB
BILIRUB BLD-MCNC: NEGATIVE MG/DL
CLARITY, POC: CLEAR
COLOR UR: ABNORMAL
EXPIRATION DATE: ABNORMAL
GLUCOSE UR STRIP-MCNC: NEGATIVE MG/DL
KETONES UR QL: NEGATIVE
LEUKOCYTE EST, POC: ABNORMAL
Lab: ABNORMAL
NITRITE UR-MCNC: POSITIVE MG/ML
PH UR: 5.5 [PH] (ref 5–8)
PROT UR STRIP-MCNC: ABNORMAL MG/DL
RBC # UR STRIP: ABNORMAL /UL
SP GR UR: 1.02 (ref 1–1.03)
UROBILINOGEN UR QL: NORMAL

## 2025-06-17 PROCEDURE — 99214 OFFICE O/P EST MOD 30 MIN: CPT | Performed by: FAMILY MEDICINE

## 2025-06-17 PROCEDURE — 81003 URINALYSIS AUTO W/O SCOPE: CPT | Performed by: FAMILY MEDICINE

## 2025-06-17 PROCEDURE — 96372 THER/PROPH/DIAG INJ SC/IM: CPT | Performed by: FAMILY MEDICINE

## 2025-06-17 RX ORDER — KETOROLAC TROMETHAMINE 30 MG/ML
60 INJECTION, SOLUTION INTRAMUSCULAR; INTRAVENOUS ONCE
Status: COMPLETED | OUTPATIENT
Start: 2025-06-17 | End: 2025-06-17

## 2025-06-17 RX ORDER — SULFAMETHOXAZOLE AND TRIMETHOPRIM 800; 160 MG/1; MG/1
1 TABLET ORAL 2 TIMES DAILY
Qty: 14 TABLET | Refills: 0 | Status: SHIPPED | OUTPATIENT
Start: 2025-06-17 | End: 2025-06-24

## 2025-06-17 RX ADMIN — KETOROLAC TROMETHAMINE 60 MG: 30 INJECTION, SOLUTION INTRAMUSCULAR; INTRAVENOUS at 10:11

## 2025-06-17 NOTE — PROGRESS NOTES
"Chief Complaint  Back Pain, Urinary Tract Infection, and Abdominal Pain (Ovary pain/)    Subjective        Tanya Brunner presents to [facility]  History of Present Illness  History of Present Illness  The patient is a 54-year-old female who presents for acute urinary symptoms.    She reports experiencing pain in her right side, which intensifies during urination. Her urinary output has been minimal, necessitating frequent bathroom visits. The onset of these symptoms was yesterday morning. She has been using a bathtub for relief, having taken six baths in the past 24 hours. She attempted self-medication with Azo yesterday but found it ineffective. She has not taken any medication today.    She has a history of ovarian cysts, including one on her left ovary and another on her fallopian tube. She has undergone a partial hysterectomy and had a cyst removed. She still has her appendix. She also reports excessive gas due to stomach issues, which she believes may be contributing to her pain. She has been using a heating pad for relief.    She suspects dehydration as she has been experiencing dry mouth and increased thirst. She has not taken her blood pressure medication today, attributing this to stress.    PAST SURGICAL HISTORY:  Partial hysterectomy  Cyst removal       Objective   Vital Signs:  /92   Pulse 90   Temp 98.1 °F (36.7 °C)   Ht 160 cm (63\")   Wt 65.3 kg (144 lb)   SpO2 98%   BMI 25.51 kg/m²   Estimated body mass index is 25.51 kg/m² as calculated from the following:    Height as of this encounter: 160 cm (63\").    Weight as of this encounter: 65.3 kg (144 lb).               Physical Exam   Physical Exam  General: Alert, appears ill and uncomfortable  Mouth/Throat: Mouth appears dry  Gastrointestinal: Right CVA tenderness, rebound tenderness, mild diffuse central abdominal tenderness, some voluntary guarding  Heart is regular  Lungs are clear       Result Review " :          Results  Labs   - Urine test: Positive nitrites     Brief Urine Lab Results  (Last result in the past 365 days)        Color   Clarity   Blood   Leuk Est   Nitrite   Protein   CREAT   Urine HCG        06/17/25 0914 Dark Yellow   Clear   3+   Large (3+)   Positive   1+                            Assessment and Plan     Diagnoses and all orders for this visit:    1. Acute pyelonephritis (Primary)  -     POCT urinalysis dipstick, automated  -     Urine Culture - Urine, Urine, Clean Catch; Future  -     Urine Culture - Urine, Urine, Clean Catch  -     sulfamethoxazole-trimethoprim (Bactrim DS) 800-160 MG per tablet; Take 1 tablet by mouth 2 (Two) Times a Day for 7 days.  Dispense: 14 tablet; Refill: 0    2. Acute generalized abdominal pain  -     Lipase; Future  -     CBC & Differential; Future  -     Comprehensive Metabolic Panel; Future  -     CT Abdomen Pelvis With Contrast; Future  -     ketorolac (TORADOL) injection 60 mg    3. Periumbilical abdominal tenderness with rebound tenderness  -     CT Abdomen Pelvis With Contrast; Future  -     ketorolac (TORADOL) injection 60 mg      Assessment & Plan  1. Pyelonephritis with acute abdominal pain with rebound.  - Symptoms include acute urinary symptoms, right-sided pain, and positive nitrites in the urine.  - Increased pain and limited mobility; patient appears ill and uncomfortable.  - Stat CT scan of the abdomen and stat labs ordered to further investigate the cause of the pain.  Need to rule out appendicitis, diverticulitis, etc.  - Shot of Toradol for pain management; Bactrim prescribed due to amoxicillin allergy.    2. Dehydration.  - Reports of dry mouth and difficulty getting enough to drink.  - Examination confirms dryness.  - Counseling on increasing fluid intake.  - Monitoring for improvement in hydration status.    3. Hypertension.  - Patient has not taken her blood pressure medication this morning.  - Elevated blood pressure noted during the  visit.  - Counseling on the importance of medication adherence.  - Monitoring blood pressure and stress management strategies discussed.            Follow Up     No follow-ups on file.  Patient was given instructions and counseling regarding her condition or for health maintenance advice. Please see specific information pulled into the AVS if appropriate.    Patient or patient representative verbalized consent for the use of Ambient Listening during the visit with  Meredith Lea Kehrer, MD for chart documentation. 6/17/2025  09:30 EDT

## 2025-06-20 LAB
BACTERIA UR CULT: ABNORMAL
BACTERIA UR CULT: ABNORMAL
OTHER ANTIBIOTIC SUSC ISLT: ABNORMAL

## 2025-06-25 ENCOUNTER — OFFICE VISIT (OUTPATIENT)
Dept: FAMILY MEDICINE CLINIC | Facility: CLINIC | Age: 55
End: 2025-06-25
Payer: COMMERCIAL

## 2025-06-25 VITALS
WEIGHT: 147.4 LBS | HEART RATE: 71 BPM | OXYGEN SATURATION: 97 % | SYSTOLIC BLOOD PRESSURE: 118 MMHG | HEIGHT: 63 IN | DIASTOLIC BLOOD PRESSURE: 79 MMHG | BODY MASS INDEX: 26.12 KG/M2

## 2025-06-25 DIAGNOSIS — N10 ACUTE PYELONEPHRITIS: Primary | ICD-10-CM

## 2025-06-25 LAB
BILIRUB BLD-MCNC: NEGATIVE MG/DL
CLARITY, POC: CLEAR
COLOR UR: YELLOW
EXPIRATION DATE: ABNORMAL
GLUCOSE UR STRIP-MCNC: NEGATIVE MG/DL
KETONES UR QL: NEGATIVE
LEUKOCYTE EST, POC: ABNORMAL
Lab: ABNORMAL
NITRITE UR-MCNC: NEGATIVE MG/ML
PH UR: 6 [PH] (ref 5–8)
PROT UR STRIP-MCNC: NEGATIVE MG/DL
RBC # UR STRIP: NEGATIVE /UL
SP GR UR: 1.02 (ref 1–1.03)
UROBILINOGEN UR QL: NORMAL

## 2025-06-25 PROCEDURE — 99213 OFFICE O/P EST LOW 20 MIN: CPT | Performed by: FAMILY MEDICINE

## 2025-06-25 PROCEDURE — 81003 URINALYSIS AUTO W/O SCOPE: CPT | Performed by: FAMILY MEDICINE

## 2025-06-25 RX ORDER — SACCHAROMYCES BOULARDII 250 MG
250 CAPSULE ORAL 2 TIMES DAILY
COMMUNITY

## 2025-06-25 NOTE — PROGRESS NOTES
"Chief Complaint  follow up on uti (Pt states she is doing fine with urine every once in a while she gets a pain but nothing like last week )    Subjective        Tanya Brunner presents to [facility]  History of Present Illness  History of Present Illness  The patient is a 54-year-old female who presents for a 1-week follow-up of acute pyelonephritis, which is a kidney infection.    She reports significant improvement in her condition since the last visit. She has completed her antibiotic course and is not experiencing any fever, chills, nausea, or vomiting. She also does not report any renal or bladder discomfort. However, she mentions persistent gastric pain, which she attributes to her pre-existing stomach issues. The pain is severe enough that she occasionally needs to pull her knees up to her chest. She has been experiencing constipation, which she believes has worsened since starting the antibiotics. She has not been taking magnesium, probiotics, or fiber supplements recently. She notes that if she discontinues the use of these supplements, her stomach issues tend to recur.       Objective   Vital Signs:  /79   Pulse 71   Ht 160 cm (63\")   Wt 66.9 kg (147 lb 6.4 oz)   SpO2 97%   BMI 26.11 kg/m²   Estimated body mass index is 26.11 kg/m² as calculated from the following:    Height as of this encounter: 160 cm (63\").    Weight as of this encounter: 66.9 kg (147 lb 6.4 oz).               Physical Exam   Physical Exam  General: Alert and in no acute distress.  Skin: Increased pigmentation noted.       Result Review :          Results  Labs   - Urine test: No more blood, trace of leukocytes, nitrites negative   - Bacteria culture: Klebsiella aerogenes    Imaging   - CT scan of the right kidney: Confirmed fluid on the right kidney, indicative of a kidney infection   Urine Culture - Urine, Urine, Clean Catch (06/17/2025 09:57)   CT Abdomen Pelvis With Contrast (06/17/2025 " 11:13)          Assessment and Plan     Diagnoses and all orders for this visit:    1. Acute pyelonephritis (Primary)  -     POC Urinalysis Dipstick, Automated      Assessment & Plan  1. Acute pyelonephritis.  - Urine analysis shows significant improvement with no blood, a trace of leukocytes likely from skin contamination, and negative nitrites.  - The causative organism was Klebsiella aerogenes. A CT scan confirmed fluid in the right kidney, consistent with a kidney infection.  - She has completed her antibiotic course and reports feeling much better.  - No further imaging is necessary unless symptoms recur.    2. Gastric pain.  - Reports gastric pain likely due to the antibiotics taken for the kidney infection.  - Physical exam shows no acute distress; patient appears well.  - Advised to resume bowel regimen, including magnesium, probiotics, and fiber, to alleviate symptoms.  - No new medications prescribed; continue current management.    3. Constipation.  - Reports constipation that has worsened since taking antibiotics.  - Physical exam shows no acute distress; patient appears well.  - Advised to resume bowel regimen, including magnesium, probiotics, and fiber, to improve bowel movements.  - No new medications prescribed; continue current management.    Follow-up  - The patient will follow up next month for her annual exam.            Follow Up     No follow-ups on file.  Patient was given instructions and counseling regarding her condition or for health maintenance advice. Please see specific information pulled into the AVS if appropriate.    Patient or patient representative verbalized consent for the use of Ambient Listening during the visit with  Meredith Lea Kehrer, MD for chart documentation. 6/25/2025  14:39 EDT

## 2025-07-15 ENCOUNTER — OFFICE VISIT (OUTPATIENT)
Dept: FAMILY MEDICINE CLINIC | Facility: CLINIC | Age: 55
End: 2025-07-15
Payer: COMMERCIAL

## 2025-07-15 VITALS
HEART RATE: 68 BPM | WEIGHT: 144 LBS | TEMPERATURE: 98.3 F | OXYGEN SATURATION: 98 % | BODY MASS INDEX: 25.52 KG/M2 | SYSTOLIC BLOOD PRESSURE: 108 MMHG | HEIGHT: 63 IN | DIASTOLIC BLOOD PRESSURE: 60 MMHG

## 2025-07-15 DIAGNOSIS — J30.1 SEASONAL ALLERGIC RHINITIS DUE TO POLLEN: ICD-10-CM

## 2025-07-15 DIAGNOSIS — Z00.00 ADULT GENERAL MEDICAL EXAMINATION: Primary | ICD-10-CM

## 2025-07-15 DIAGNOSIS — E78.49 OTHER HYPERLIPIDEMIA: ICD-10-CM

## 2025-07-15 DIAGNOSIS — Z23 NEED FOR SHINGLES VACCINE: ICD-10-CM

## 2025-07-15 DIAGNOSIS — S61.452A DOG BITE OF LEFT HAND, INITIAL ENCOUNTER: ICD-10-CM

## 2025-07-15 DIAGNOSIS — I10 ESSENTIAL HYPERTENSION: ICD-10-CM
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
## 2025-07-15 PROCEDURE — 90750 HZV VACC RECOMBINANT IM: CPT | Performed by: FAMILY MEDICINE

## 2025-07-15 PROCEDURE — 99396 PREV VISIT EST AGE 40-64: CPT | Performed by: FAMILY MEDICINE

## 2025-07-15 PROCEDURE — 90471 IMMUNIZATION ADMIN: CPT | Performed by: FAMILY MEDICINE

## 2025-07-15 RX ORDER — CEPHALEXIN 500 MG/1
500 CAPSULE ORAL 2 TIMES DAILY
Qty: 14 CAPSULE | Refills: 0 | Status: SHIPPED | OUTPATIENT
Start: 2025-07-15 | End: 2025-07-16 | Stop reason: SDUPTHER

## 2025-07-15 RX ORDER — CHLORAL HYDRATE 500 MG
CAPSULE ORAL
COMMUNITY

## 2025-07-15 RX ORDER — ATENOLOL AND CHLORTHALIDONE TABLET 50; 25 MG/1; MG/1
0.5 TABLET ORAL DAILY
Qty: 45 TABLET | Refills: 3 | Status: SHIPPED | OUTPATIENT
Start: 2025-07-15

## 2025-07-15 RX ORDER — PARSLEY 450 MG
CAPSULE ORAL
COMMUNITY

## 2025-07-15 RX ORDER — LANOLIN ALCOHOL/MO/W.PET/CERES
50 CREAM (GRAM) TOPICAL DAILY
COMMUNITY

## 2025-07-15 NOTE — PROGRESS NOTES
Subjective   Tanya Brunner is a 54 y.o. female who presents for annual female wellness exam.  Chief Complaint   Patient presents with    Annual Exam     No pap       Hypertension    Hyperlipidemia    Animal Bite     Dog bite on            History of Present Illness  The patient is a 54-year-old female who presents for her annual exam.    She reports feeling well overall, with the exception of irregular menopausal symptoms, including hot flashes and night sweats. She does not consume alcohol and has not been actively trying to lose weight. She only eats once a day. She is up to date with her dental and eye examinations, having seen her eye doctor last week. She is interested in receiving the shingles vaccine today.    She is adhering to her daily blood pressure medication regimen.    She experienced a dog bite on her hand from her [employer]. The dog is up to date with its rabies vaccinations. She received a tetanus shot last year. Initially, she noticed swelling and redness around the bite area, but these symptoms have since subsided. She believes the dog may have damaged a nerve as she experienced sharp pain at the onset, but this has also resolved. She has been applying peroxide to the wound.    She experiences loose stools after eating, which occasionally cause abdominal discomfort. She still has her gallbladder. She also reports constipation when consuming bread, leading to difficulty in bowel movements for up to 2 days.    She takes Singulair as needed and uses Flonase, which effectively manage her allergy symptoms.    She reports no chest pain, shortness of breath, nausea, vomiting, or changes in bowel or bladder habits. She also reports no recent kidney infections.    Social History:  Diet: She only eats once a day.  Alcohol: She does not consume alcohol.       Menstrual History: Menopause s/p hysterectomy  Pregnancy History:   Sexual History: With spouse  Contraception: N/A  Hormone  Replacement Therapy: N/A  Diet: Standard  Exercise: Yes  Last dental exam: up to date  Last eye exam: up to date    Mammogram: 2024  Pap Smear: na  Bone Density: na  Colon Cancer Screenin/10/2018    Immunization History   Administered Date(s) Administered    Fluzone Quad >6mos (Multi-dose) 2018    Tdap 07/10/2023       The following portions of the patient's history were reviewed and updated as appropriate: allergies, current medications, past family history, past medical history, past social history, past surgical history and problem list.    Past Medical History:   Diagnosis Date    Allergic     Fibrocystic breast     Hypertension        Past Surgical History:   Procedure Laterality Date    COLONOSCOPY  2019    INGUINAL HERNIA REPAIR Left 1974    LAPAROSCOPIC VAGINAL HYSTERECTOMY  2010    OVARIAN CYST REMOVAL Left     SUBTOTAL HYSTERECTOMY         Family History   Problem Relation Age of Onset    Cancer Mother     Hyperlipidemia Mother     Kidney disease Mother     Alcohol abuse Brother         Passed away at 40    Hyperlipidemia Brother        Social History     Socioeconomic History    Marital status:    Tobacco Use    Smoking status: Never    Smokeless tobacco: Never   Vaping Use    Vaping status: Never Used   Substance and Sexual Activity    Alcohol use: Not Currently     Comment: Once in a blue moon    Drug use: Never    Sexual activity: Defer         Current Outpatient Medications:     Ashwagandha 500 MG capsule, Take  by mouth., Disp: , Rfl:     atenolol-chlorthalidone (TENORETIC) 50-25 MG per tablet, Take 0.5 tablets by mouth Daily., Disp: 45 tablet, Rfl: 3    Black Cohosh 540 MG capsule, , Disp: , Rfl:     fluticasone (FLONASE) 50 MCG/ACT nasal spray, 2 sprays into the nostril(s) as directed by provider Daily., Disp: 16 g, Rfl: 5    magnesium citrate 1.745 GM/30ML solution solution, Take  by mouth 1 (One) Time., Disp: , Rfl:     montelukast (SINGULAIR) 10 MG tablet, Take 1 tablet by  "mouth every night at bedtime., Disp: 90 tablet, Rfl: 3    Omega-3 Fatty Acids (fish oil) 1000 MG capsule capsule, Take  by mouth Daily With Breakfast., Disp: , Rfl:     Pumpkin Seed 500 MG capsule, Take  by mouth., Disp: , Rfl:     saccharomyces boulardii (FLORASTOR) 250 MG capsule, Take 1 capsule by mouth 2 (Two) Times a Day., Disp: , Rfl:     vitamin B-12 (CYANOCOBALAMIN) 100 MCG tablet, Take 0.5 tablets by mouth Daily., Disp: , Rfl:     vitamin B-6 (PYRIDOXINE) 50 MG tablet, Take 1 tablet by mouth Daily., Disp: , Rfl:     cephalexin (KEFLEX) 500 MG capsule, Take 1 capsule by mouth 2 (Two) Times a Day for 7 days., Disp: 14 capsule, Rfl: 0    cholecalciferol (VITAMIN D3) 10 MCG (400 UNIT) tablet, Take 1 tablet by mouth Daily. (Patient not taking: Reported on 7/15/2025), Disp: , Rfl:     Collagen-Vitamin C-Biotin (COLLAGEN 1500/C PO), Take  by mouth. (Patient not taking: Reported on 7/15/2025), Disp: , Rfl:     Homeopathic Products (ALLERGY MEDICINE PO), , Disp: , Rfl:     Specialty Vitamins Products (MemorAll) capsule, , Disp: , Rfl:     Review of Systems    Objective   Vitals:    07/15/25 1306   BP: 108/60   Pulse: 68   Temp: 98.3 °F (36.8 °C)   SpO2: 98%   Weight: 65.3 kg (144 lb)   Height: 160 cm (63\")     Body mass index is 25.51 kg/m².  Physical Exam  Vitals and nursing note reviewed.   Constitutional:       General: She is not in acute distress.     Appearance: Normal appearance. She is well-developed.   HENT:      Head: Normocephalic and atraumatic.      Right Ear: Tympanic membrane, ear canal and external ear normal.      Left Ear: Tympanic membrane, ear canal and external ear normal.      Nose: Nose normal.      Mouth/Throat:      Mouth: Mucous membranes are moist.      Pharynx: Oropharynx is clear. No oropharyngeal exudate or posterior oropharyngeal erythema.   Eyes:      Conjunctiva/sclera: Conjunctivae normal.      Pupils: Pupils are equal, round, and reactive to light.   Neck:      Thyroid: No " thyromegaly.   Cardiovascular:      Rate and Rhythm: Normal rate and regular rhythm.      Heart sounds: No murmur heard.  Pulmonary:      Effort: Pulmonary effort is normal.      Breath sounds: Normal breath sounds. No wheezing.   Abdominal:      General: Abdomen is flat. Bowel sounds are normal. There is no distension.      Palpations: Abdomen is soft. There is no mass.      Tenderness: There is no abdominal tenderness.      Hernia: No hernia is present.   Musculoskeletal:         General: No swelling. Normal range of motion.      Cervical back: Normal range of motion and neck supple.      Right lower leg: No edema.      Left lower leg: No edema.   Lymphadenopathy:      Cervical: No cervical adenopathy.   Skin:     General: Skin is warm and dry.      Capillary Refill: Capillary refill takes less than 2 seconds.      Findings: Lesion present. No rash.   Neurological:      General: No focal deficit present.      Mental Status: She is alert and oriented to person, place, and time.      Cranial Nerves: No cranial nerve deficit.   Psychiatric:         Mood and Affect: Mood normal.         Behavior: Behavior normal.       Physical Exam  Skin: Dog bite on left hand with some swelling and redness. No current signs of infection. Healing well.         Results  Imaging   - CT scan of the gallbladder: No abnormalities in the gallbladder       Assessment & Plan   Diagnoses and all orders for this visit:    1. Adult general medical examination (Primary)  -     TSH  -     Lipid Panel  -     CBC & Differential  -     Comprehensive Metabolic Panel    2. Essential hypertension  -     TSH  -     Lipid Panel  -     CBC & Differential  -     Comprehensive Metabolic Panel  -     atenolol-chlorthalidone (TENORETIC) 50-25 MG per tablet; Take 0.5 tablets by mouth Daily.  Dispense: 45 tablet; Refill: 3    3. Other hyperlipidemia  -     TSH  -     Lipid Panel  -     CBC & Differential  -     Comprehensive Metabolic Panel    4. Seasonal  allergic rhinitis due to pollen    5. Need for shingles vaccine  -     Shingrix Vaccine    6. Dog bite of left hand, initial encounter  -     cephalexin (KEFLEX) 500 MG capsule; Take 1 capsule by mouth 2 (Two) Times a Day for 7 days.  Dispense: 14 capsule; Refill: 0      Assessment & Plan  1. Health maintenance.  - Weight has decreased from 149 in January to 147 a few weeks ago, and currently stands at 144.  - Blood pressure readings are within the normal range today.  - Advised to maintain a diet low in sugar and alcohol to alleviate menopausal symptoms such as hot flashes and night sweats.  - Shingles vaccine will be administered today, with the second dose scheduled for 6 months from now during her blood pressure check. The pneumonia vaccine will be revisited next year.    2. Blood pressure management.  - Blood pressure is excellent today.  - Refill of Tenoretic prescription provided.    3. Dog bite on hand.  - Patient reported some swelling and redness, but the wound is healing well.  - Prophylactic dose of Keflex 500 mg twice daily for 7 days prescribed.    4. Gastrocolic reflux.  - Reports experiencing loose stools after eating, which may be indicative of gastrocolic reflux or dumping syndrome.  - Advised to inform the provider if experiencing any abdominal pain or nausea as it could be related to gallbladder disease.    5. Allergies.  - Taking Singulair and Flonase as needed to control allergy symptoms.  - Advised to continue using these medications as needed and to notify the provider when a refill is required.    Follow-up  - Follow-up in 6 months for a blood pressure check.            Discussed the importance of maintaining a healthy weight and getting regular exercise.  Educated patient on the benefits of healthy diet.  Advise follow-up annually for wellness exams.    There are no Patient Instructions on file for this visit.    Patient or patient representative verbalized consent for the use of Ambient  Listening during the visit with  Meredith Lea Kehrer, MD for chart documentation. 7/15/2025  13:06 EDT
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
## 2025-07-16 DIAGNOSIS — S61.452A DOG BITE OF LEFT HAND, INITIAL ENCOUNTER: ICD-10-CM

## 2025-07-16 LAB
ALBUMIN SERPL-MCNC: 4.3 G/DL (ref 3.8–4.9)
ALP SERPL-CCNC: 75 IU/L (ref 44–121)
ALT SERPL-CCNC: 33 IU/L (ref 0–32)
AST SERPL-CCNC: 27 IU/L (ref 0–40)
BASOPHILS # BLD AUTO: 0.1 X10E3/UL (ref 0–0.2)
BASOPHILS NFR BLD AUTO: 1 %
BILIRUB SERPL-MCNC: 0.4 MG/DL (ref 0–1.2)
BUN SERPL-MCNC: 14 MG/DL (ref 6–24)
BUN/CREAT SERPL: 20 (ref 9–23)
CALCIUM SERPL-MCNC: 10 MG/DL (ref 8.7–10.2)
CHLORIDE SERPL-SCNC: 97 MMOL/L (ref 96–106)
CHOLEST SERPL-MCNC: 198 MG/DL (ref 100–199)
CO2 SERPL-SCNC: 23 MMOL/L (ref 20–29)
CREAT SERPL-MCNC: 0.71 MG/DL (ref 0.57–1)
EGFRCR SERPLBLD CKD-EPI 2021: 100 ML/MIN/1.73
EOSINOPHIL # BLD AUTO: 0.1 X10E3/UL (ref 0–0.4)
EOSINOPHIL NFR BLD AUTO: 1 %
ERYTHROCYTE [DISTWIDTH] IN BLOOD BY AUTOMATED COUNT: 12.1 % (ref 11.7–15.4)
GLOBULIN SER CALC-MCNC: 3.3 G/DL (ref 1.5–4.5)
GLUCOSE SERPL-MCNC: 113 MG/DL (ref 70–99)
HCT VFR BLD AUTO: 42.7 % (ref 34–46.6)
HDLC SERPL-MCNC: 37 MG/DL
HGB BLD-MCNC: 14.6 G/DL (ref 11.1–15.9)
IMM GRANULOCYTES # BLD AUTO: 0 X10E3/UL (ref 0–0.1)
IMM GRANULOCYTES NFR BLD AUTO: 0 %
LDLC SERPL CALC-MCNC: 119 MG/DL (ref 0–99)
LYMPHOCYTES # BLD AUTO: 3.5 X10E3/UL (ref 0.7–3.1)
LYMPHOCYTES NFR BLD AUTO: 38 %
MCH RBC QN AUTO: 35.7 PG (ref 26.6–33)
MCHC RBC AUTO-ENTMCNC: 34.2 G/DL (ref 31.5–35.7)
MCV RBC AUTO: 104 FL (ref 79–97)
MONOCYTES # BLD AUTO: 0.6 X10E3/UL (ref 0.1–0.9)
MONOCYTES NFR BLD AUTO: 7 %
NEUTROPHILS # BLD AUTO: 4.9 X10E3/UL (ref 1.4–7)
NEUTROPHILS NFR BLD AUTO: 53 %
PLATELET # BLD AUTO: 261 X10E3/UL (ref 150–450)
POTASSIUM SERPL-SCNC: 3.8 MMOL/L (ref 3.5–5.2)
PROT SERPL-MCNC: 7.6 G/DL (ref 6–8.5)
RBC # BLD AUTO: 4.09 X10E6/UL (ref 3.77–5.28)
SODIUM SERPL-SCNC: 139 MMOL/L (ref 134–144)
TRIGL SERPL-MCNC: 240 MG/DL (ref 0–149)
TSH SERPL DL<=0.005 MIU/L-ACNC: 1.57 UIU/ML (ref 0.45–4.5)
VLDLC SERPL CALC-MCNC: 42 MG/DL (ref 5–40)
WBC # BLD AUTO: 9.2 X10E3/UL (ref 3.4–10.8)

## 2025-07-16 RX ORDER — CEPHALEXIN 500 MG/1
500 CAPSULE ORAL 2 TIMES DAILY
Qty: 14 CAPSULE | Refills: 0 | Status: SHIPPED | OUTPATIENT
Start: 2025-07-16 | End: 2025-07-23

## 2025-07-16 NOTE — TELEPHONE ENCOUNTER
Caller: Brunner, Tanya    Relationship: Self    Best call back number: [phone]     Requested Prescriptions:   Requested Prescriptions     Pending Prescriptions Disp Refills    cephalexin (KEFLEX) 500 MG capsule 14 capsule 0     Sig: Take 1 capsule by mouth 2 (Two) Times a Day for 7 days.        Pharmacy where request should be sent: [facility]ACY #[id number] - [place], [place] - [address] [facility] [phone] [facility] [phone] FX     Last office visit with prescribing clinician: 7/15/2025   Last telemedicine visit with prescribing clinician: Visit date not found   Next office visit with prescribing clinician: 1/15/2026     Additional details provided by patient: PATIENT WOULD LIKE PRESCRIPTION SENT TO A DIFFERENT PHARMACY    Does the patient have less than a 3 day supply:  [x] Yes  [] No    Would you like a call back once the refill request has been completed: [x] Yes [] No    If the office needs to give you a call back, can they leave a voicemail: [x] Yes [] No    Ruth Giradr Rep   07/16/25 12:13 EDT      message to the clinical team. Please allow 48 hours for the clinical staff to follow up on this request.”